# Patient Record
Sex: FEMALE | Race: WHITE | Employment: STUDENT | URBAN - METROPOLITAN AREA
[De-identification: names, ages, dates, MRNs, and addresses within clinical notes are randomized per-mention and may not be internally consistent; named-entity substitution may affect disease eponyms.]

---

## 2017-06-05 DIAGNOSIS — Z30.40 ENCOUNTER FOR SURVEILLANCE OF CONTRACEPTIVES: ICD-10-CM

## 2017-06-05 RX ORDER — NORETHINDRONE ACETATE AND ETHINYL ESTRADIOL AND FERROUS FUMARATE 1MG-20(21)
KIT ORAL
Qty: 28 TABLET | Refills: 0 | Status: SHIPPED | OUTPATIENT
Start: 2017-06-05 | End: 2017-07-05

## 2017-07-05 DIAGNOSIS — Z30.40 ENCOUNTER FOR SURVEILLANCE OF CONTRACEPTIVES: ICD-10-CM

## 2017-07-05 RX ORDER — NORETHINDRONE ACETATE AND ETHINYL ESTRADIOL AND FERROUS FUMARATE 1MG-20(21)
KIT ORAL
Qty: 28 TABLET | Refills: 0 | Status: SHIPPED | OUTPATIENT
Start: 2017-07-05 | End: 2017-07-10

## 2017-07-10 ENCOUNTER — OFFICE VISIT (OUTPATIENT)
Dept: FAMILY MEDICINE | Facility: CLINIC | Age: 27
End: 2017-07-10
Payer: COMMERCIAL

## 2017-07-10 VITALS
SYSTOLIC BLOOD PRESSURE: 115 MMHG | BODY MASS INDEX: 22.49 KG/M2 | DIASTOLIC BLOOD PRESSURE: 78 MMHG | HEIGHT: 65 IN | OXYGEN SATURATION: 100 % | WEIGHT: 135 LBS | TEMPERATURE: 97.5 F | HEART RATE: 63 BPM

## 2017-07-10 DIAGNOSIS — Z11.3 SCREENING EXAMINATION FOR VENEREAL DISEASE: ICD-10-CM

## 2017-07-10 DIAGNOSIS — R87.610 PAPANICOLAOU SMEAR OF CERVIX WITH ATYPICAL SQUAMOUS CELLS OF UNDETERMINED SIGNIFICANCE (ASC-US): ICD-10-CM

## 2017-07-10 DIAGNOSIS — Z00.00 ENCOUNTER FOR ROUTINE ADULT HEALTH EXAMINATION WITHOUT ABNORMAL FINDINGS: Primary | ICD-10-CM

## 2017-07-10 DIAGNOSIS — Z30.41 ENCOUNTER FOR SURVEILLANCE OF CONTRACEPTIVE PILLS: ICD-10-CM

## 2017-07-10 PROCEDURE — 99395 PREV VISIT EST AGE 18-39: CPT | Performed by: PHYSICIAN ASSISTANT

## 2017-07-10 PROCEDURE — 87491 CHLMYD TRACH DNA AMP PROBE: CPT | Performed by: PHYSICIAN ASSISTANT

## 2017-07-10 PROCEDURE — 88175 CYTOPATH C/V AUTO FLUID REDO: CPT | Performed by: PHYSICIAN ASSISTANT

## 2017-07-10 PROCEDURE — G0476 HPV COMBO ASSAY CA SCREEN: HCPCS | Performed by: PHYSICIAN ASSISTANT

## 2017-07-10 PROCEDURE — 88141 CYTOPATH C/V INTERPRET: CPT | Performed by: PHYSICIAN ASSISTANT

## 2017-07-10 PROCEDURE — 87591 N.GONORRHOEAE DNA AMP PROB: CPT | Performed by: PHYSICIAN ASSISTANT

## 2017-07-10 RX ORDER — NORETHINDRONE ACETATE AND ETHINYL ESTRADIOL 1MG-20(21)
1 KIT ORAL DAILY
Qty: 90 TABLET | Refills: 3 | Status: SHIPPED | OUTPATIENT
Start: 2017-07-10 | End: 2018-07-12

## 2017-07-10 NOTE — NURSING NOTE
"Chief Complaint   Patient presents with     Physical       Initial /78  Pulse 63  Temp 97.5  F (36.4  C) (Oral)  Ht 5' 5\" (1.651 m)  Wt 135 lb (61.2 kg)  SpO2 100%  BMI 22.47 kg/m2 Estimated body mass index is 22.47 kg/(m^2) as calculated from the following:    Height as of this encounter: 5' 5\" (1.651 m).    Weight as of this encounter: 135 lb (61.2 kg).  Medication Reconciliation: complete  Alysa Zazueta M.A.    "

## 2017-07-10 NOTE — PROGRESS NOTES
SUBJECTIVE:   CC: Saranya Zaman is an 27 year old woman who presents for preventive health visit.     Healthy Habits:    Do you get at least three servings of calcium containing foods daily (dairy, green leafy vegetables, etc.)? yes    Amount of exercise or daily activities, outside of work: 2-3 day(s) per week    Problems taking medications regularly No    Medication side effects: No    Have you had an eye exam in the past two years? no    Do you see a dentist twice per year? no    Do you have sleep apnea, excessive snoring or daytime drowsiness?no      Needs refill of birth control. Denies any problems with her birth control.      Today's PHQ-2 Score:   PHQ-2 ( 1999 Pf[employer]) 7/10/2017 6/1/2015   Q1: Little interest or pleasure in doing things 0 0   Q2: Feeling down, depressed or hopeless 0 0   PHQ-2 Score 0 0       Abuse: Current or Past(Physical, Sexual or Emotional)- No  Do you feel safe in your environment - Yes    Social History   Substance Use Topics     Smoking status: Never Smoker     Smokeless tobacco: Never Used     Alcohol use No     The patient does not drink >3 drinks per day nor >7 drinks per week.    Reviewed orders with patient.  Reviewed health maintenance and updated orders accordingly - Yes  BP Readings from Last 3 Encounters:   07/10/17 115/78   07/08/16 122/81   06/08/15 104/65    Wt Readings from Last 3 Encounters:   07/10/17 135 lb (61.2 kg)   07/08/16 142 lb (64.4 kg)   06/08/15 143 lb 3.2 oz (65 kg)                  Patient Active Problem List   Diagnosis     Chronic rhinitis     Fibroadenoma of breast     CARDIOVASCULAR SCREENING; LDL GOAL LESS THAN 160     Contraception     Papanicolaou smear of cervix with atypical squamous cells of undetermined significance (ASC-US)     Intermittent asthma, uncomplicated     Past Surgical History:   Procedure Laterality Date     BIOPSY OF BREAST, INCISIONAL  04/17/2008    left fibroadenoma     LUMPECTOMY BREAST  3/11    right      SURGICAL  HISTORY OF -   2010    wisdom teeth       Social History   Substance Use Topics     Smoking status: Never Smoker     Smokeless tobacco: Never Used     Alcohol use No     Family History   Problem Relation Age of Onset     Hypertension Mother      Arthritis Mother      Prostate Cancer Father      Hyperlipidemia Father      OSTEOPOROSIS Maternal Grandmother      CANCER Maternal Grandfather      CEREBROVASCULAR DISEASE Maternal Grandfather      70's, smoker     Cardiovascular Paternal Grandmother      DIABETES No family hx of      Breast Cancer No family hx of      Colon Cancer No family hx of      Thyroid Disease No family hx of      Genetic Disorder No family hx of          Current Outpatient Prescriptions   Medication Sig Dispense Refill     norethindrone-ethinyl estradiol (MICROGESTIN FE 1/20) 1-20 MG-MCG per tablet Take 1 tablet by mouth daily 90 tablet 3     valACYclovir (VALTREX) 500 MG tablet TAKE 1 TABLET BY MOUTH DAILY. TAKE 1 TABLET TWICE DAILY FOR 3 DAYS WITH AN OUTBREAK 30 tablet 5     albuterol (PROAIR HFA, PROVENTIL HFA, VENTOLIN HFA) 108 (90 BASE) MCG/ACT inhaler Inhale 2 puffs into the lungs every 6 hours 1 Inhaler 3     fexofenadine (ALLEGRA) 60 MG tablet Take 1 tablet by mouth 2 times daily. Will need office visit for further refills 60 tablet 0     [DISCONTINUED] MICROGESTIN FE 1/20 1-20 MG-MCG per tablet TAKE 1 TABLET BY MOUTH DAILY 28 tablet 0     beclomethasone (QVAR) 80 MCG/ACT Inhaler Inhale 1 puff into the lungs 2 times daily (Patient not taking: Reported on 7/10/2017) 1 Inhaler 1     Allergies   Allergen Reactions     Cats      Dogs      Dust Mites      Grass      Horse Allergy      Mold      Pollen [Pollen Extract]        Mammogram not appropriate for this patient based on age.    Pertinent mammograms are reviewed under the imaging tab.  History of abnormal Pap smear:   YES - updated in Problem List and Health Maintenance accordingly  Last 3 Pap Results:   PAP (no units)   Date Value  "  07/08/2016 NIL   06/01/2015 ASC-US (A)   09/21/2009 NIL       Reviewed and updated as needed this visit by clinical staff  Allergies  Meds         Reviewed and updated as needed this visit by Provider            ROS:  C: NEGATIVE for fever, chills, change in weight  I: NEGATIVE for worrisome rashes, moles or lesions  E: NEGATIVE for vision changes or irritation  ENT: NEGATIVE for ear, mouth and throat problems  R: NEGATIVE for significant cough or SOB  B: NEGATIVE for masses, tenderness or discharge  CV: NEGATIVE for chest pain, palpitations or peripheral edema  GI: NEGATIVE for nausea, abdominal pain, heartburn, or change in bowel habits  : NEGATIVE for unusual urinary or vaginal symptoms. Periods are regular.  M: NEGATIVE for significant arthralgias or myalgia  N: NEGATIVE for weakness, dizziness or paresthesias  P: NEGATIVE for changes in mood or affect    OBJECTIVE:   /78  Pulse 63  Temp 97.5  F (36.4  C) (Oral)  Ht 5' 5\" (1.651 m)  Wt 135 lb (61.2 kg)  SpO2 100%  BMI 22.47 kg/m2  EXAM:  GENERAL: healthy, alert and no distress  EYES: Eyes grossly normal to inspection, PERRL and conjunctivae and sclerae normal  HENT: ear canals and TM's normal, nose and mouth without ulcers or lesions  NECK: no adenopathy, no asymmetry, masses, or scars and thyroid normal to palpation  RESP: lungs clear to auscultation - no rales, rhonchi or wheezes  BREAST: normal without masses, tenderness or nipple discharge and no palpable axillary masses or adenopathy  CV: regular rate and rhythm, normal S1 S2, no S3 or S4, no murmur, click or rub, no peripheral edema and peripheral pulses strong  ABDOMEN: soft, nontender, no hepatosplenomegaly, no masses and bowel sounds normal   (female): normal female external genitalia, normal urethral meatus , vaginal mucosa pink, moist, well rugated and normal cervix, adnexae, and uterus without masses.  MS: no gross musculoskeletal defects noted, no edema  SKIN: no suspicious " "lesions or rashes  NEURO: Normal strength and tone, mentation intact and speech normal  PSYCH: mentation appears normal, affect normal/bright    ASSESSMENT/PLAN:       ICD-10-CM    1. Encounter for routine adult health examination without abnormal findings Z00.00    2. Encounter for surveillance of contraceptive pills Z30.41 norethindrone-ethinyl estradiol (MICROGESTIN FE 1/20) 1-20 MG-MCG per tablet   3. Papanicolaou smear of cervix with atypical squamous cells of undetermined significance (ASC-US) R87.610 Pap imaged thin layer diagnostic reflex to HPV if ASCUS     HPV High Risk Types DNA Cervical   4. Screening examination for venereal disease Z11.3 Chlamydia trachomatis PCR     Neisseria gonorrhoeae PCR       COUNSELING:   Reviewed preventive health counseling, as reflected in patient instructions       Regular exercise       Healthy diet/nutrition         reports that she has never smoked. She has never used smokeless tobacco.    Estimated body mass index is 22.47 kg/(m^2) as calculated from the following:    Height as of this encounter: 5' 5\" (1.651 m).    Weight as of this encounter: 135 lb (61.2 kg).       Counseling Resources:  ATP IV Guidelines  Pooled Cohorts Equation Calculator  Breast Cancer Risk Calculator  FRAX Risk Assessment  ICSI Preventive Guidelines  Dietary Guidelines for Americans, 2010  USDA's MyPlate  ASA Prophylaxis  Lung CA Screening    Korin Salas PA-C  [facility]  "

## 2017-07-10 NOTE — MR AVS SNAPSHOT
After Visit Summary   7/10/2017    Saranya Zaman    MRN: [id number]           Patient Information     Date Of Birth          1990        Visit Information        Provider Department      7/10/2017 8:00 AM Korin Salas PA-C [facility]        Today's Diagnoses     Encounter for routine adult health examination without abnormal findings    -  1    Encounter for surveillance of contraceptives        Papanicolaou smear of cervix with atypical squamous cells of undetermined significance (ASC-US)        Screening examination for venereal disease          Care Instructions      Preventive Health Recommendations  Female Ages 26 - 39  Yearly exam:   See your health care provider every year in order to    Review health changes.     Discuss preventive care.      Review your medicines if you your doctor has prescribed any.    Until age 30: Get a Pap test every three years (more often if you have had an abnormal result).    After age 30: Talk to your doctor about whether you should have a Pap test every 3 years or have a Pap test with HPV screening every 5 years.   You do not need a Pap test if your uterus was removed (hysterectomy) and you have not had cancer.  You should be tested each year for STDs (sexually transmitted diseases), if you're at risk.   Talk to your provider about how often to have your cholesterol checked.  If you are at risk for diabetes, you should have a diabetes test (fasting glucose).  Shots: Get a flu shot each year. Get a tetanus shot every 10 years.   Nutrition:     Eat at least 5 servings of fruits and vegetables each day.    Eat whole-grain bread, whole-wheat pasta and brown rice instead of white grains and rice.    Talk to your provider about Calcium and Vitamin D.     Lifestyle    Exercise at least 150 minutes a week (30 minutes a day, 5 days of the week). This will help you control your weight and prevent disease.    Limit alcohol to one drink per  "day.    No smoking.     Wear sunscreen to prevent skin cancer.    See your dentist every six months for an exam and cleaning.            Follow-ups after your visit        Who to contact     If you have questions or need follow up information about today's clinic visit or your schedule please contact [facility] directly at [phone].  Normal or non-critical lab and imaging results will be communicated to you by MyChart, letter or phone within 4 business days after the clinic has received the results. If you do not hear from us within 7 days, please contact the clinic through [employer] or phone. If you have a critical or abnormal lab result, we will notify you by phone as soon as possible.  Submit refill requests through [employer] or call your pharmacy and they will forward the refill request to us. Please allow 3 business days for your refill to be completed.          Additional Information About Your Visit        MyChart Information     [employer] lets you send messages to your doctor, view your test results, renew your prescriptions, schedule appointments and more. To sign up, go to [email or web address] . Click on \"Log in\" on the left side of the screen, which will take you to the Welcome page. Then click on \"Sign up Now\" on the right side of the page.     You will be asked to enter the access code listed below, as well as some personal information. Please follow the directions to create your username and password.     Your access code is: [email or web address]  Expires: 10/8/2017  8:31 AM     Your access code will  in 90 days. If you need help or a new code, please call your [place] clinic or [phone].        Care EveryWhere ID     This is your Care EveryWhere ID. This could be used by other organizations to access your [place] medical records  [id number]        Your Vitals Were     Pulse Temperature Height Pulse Oximetry BMI (Body Mass Index)       63 97.5  F (36.4  C) (Oral) 5' 5\" (1.651 m) " 100% 22.47 kg/m2        Blood Pressure from Last 3 Encounters:   07/10/17 115/78   07/08/16 122/81   06/08/15 104/65    Weight from Last 3 Encounters:   07/10/17 135 lb (61.2 kg)   07/08/16 142 lb (64.4 kg)   06/08/15 143 lb 3.2 oz (65 kg)              We Performed the Following     Chlamydia trachomatis PCR     HPV High Risk Types DNA Cervical     Neisseria gonorrhoeae PCR     Pap imaged thin layer diagnostic reflex to HPV if ASCUS        Primary Care Provider Office Phone # Fax #    Keke Rivera PA-C [phone]       XXX RESIGNED XXX [address]  [facility] [address]        Equal Access to Services     ANAND HUMPHREY : Hadii jeremías ku hadasho Soomaali, waaxda luqadaha, qaybta kaalmada adeegyada, waxay cordeliain kevin nunez . So [facility] [phone].    ATENCIÓN: Si habla español, tiene a hess disposición servicios gratuitos de asistencia lingüística. Ll[facility] [phone].    We comply with applicable federal civil rights laws and [place] laws. We do not discriminate on the basis of race, color, national origin, age, disability sex, sexual orientation or gender identity.            Thank you!     Thank you for choosing [facility]  for your care. Our goal is always to provide you with excellent care. Hearing back from our patients is one way we can continue to improve our services. Please take a few minutes to complete the written survey that you may receive in the mail after your visit with us. Thank you!             Your Updated Medication List - Protect others around you: Learn how to safely use, store and throw away your medicines at www.disposemymeds.org.          This list is accurate as of: 7/10/17  8:31 AM.  Always use your most recent med list.                   Brand Name Dispense Instructions for use Diagnosis    albuterol 108 (90 BASE) MCG/ACT Inhaler    PROAIR HFA/PROVENTIL HFA/VENTOLIN HFA    1 Inhaler    Inhale 2 puffs into the lungs every 6 hours     Intermittent asthma, uncomplicated       beclomethasone 80 MCG/ACT Inhaler    QVAR    1 Inhaler    Inhale 1 puff into the lungs 2 times daily    Intermittent asthma, uncomplicated       fexofenadine 60 MG tablet    ALLEGRA    60 tablet    Take 1 tablet by mouth 2 times daily. Will need office visit for further refills    Chronic rhinitis       MICROGESTIN FE 1/20 1-20 MG-MCG per tablet   Generic drug:  norethindrone-ethinyl estradiol     28 tablet    TAKE 1 TABLET BY MOUTH DAILY    Encounter for surveillance of contraceptives       valACYclovir 500 MG tablet    VALTREX    30 tablet    TAKE 1 TABLET BY MOUTH DAILY. TAKE 1 TABLET TWICE DAILY FOR 3 DAYS WITH AN OUTBREAK    Cold sore

## 2017-07-10 NOTE — LETTER
[facility]  [address] [place] [facility] [place] [address] [phone]        July 11, 2017    Saranya Zaman  [address]  [place] [address]            Dear Saranya,    The results of your recent tests were normal.  Below is a copy of the results.  It was a pleasure to see you at your last appointment.    If you have any questions or concerns, please call myself or my nurse at [phone].    Sincerely,    Korin Salas PA-C/ks    Results for orders placed or performed in visit on 07/10/17   Chlamydia trachomatis PCR   Result Value Ref Range    Specimen Description Cervical     Chlamydia Trachomatis PCR  NEG     Negative   Negative for C. trachomatis rRNA by transcription mediated amplification.   A negative result by transcription mediated amplification does not preclude the   presence of C. trachomatis infection because results are dependent on proper   and adequate collection, absence of inhibitors, and sufficient rRNA to be   detected.     Neisseria gonorrhoeae PCR   Result Value Ref Range    Specimen Descrip Cervical     N Gonorrhea PCR  NEG     Negative   Negative for N. gonorrhoeae rRNA by transcription mediated amplification.   A negative result by transcription mediated amplification does not preclude the   presence of N. gonorrhoeae infection because results are dependent on proper   and adequate collection, absence of inhibitors, and sufficient rRNA to be   detected.

## 2017-07-11 LAB
C TRACH DNA SPEC QL NAA+PROBE: NORMAL
N GONORRHOEA DNA SPEC QL NAA+PROBE: NORMAL
SPECIMEN SOURCE: NORMAL
SPECIMEN SOURCE: NORMAL

## 2017-07-11 ASSESSMENT — ASTHMA QUESTIONNAIRES: ACT_TOTALSCORE: 24

## 2017-07-14 LAB
COPATH REPORT: ABNORMAL
PAP: ABNORMAL

## 2017-07-17 LAB
FINAL DIAGNOSIS: ABNORMAL
HPV HR 12 DNA CVX QL NAA+PROBE: POSITIVE
HPV16 DNA SPEC QL NAA+PROBE: NEGATIVE
HPV18 DNA SPEC QL NAA+PROBE: NEGATIVE
SPECIMEN DESCRIPTION: ABNORMAL

## 2017-08-14 ENCOUNTER — OFFICE VISIT (OUTPATIENT)
Dept: OBGYN | Facility: CLINIC | Age: 27
End: 2017-08-14
Payer: COMMERCIAL

## 2017-08-14 VITALS
OXYGEN SATURATION: 97 % | HEART RATE: 99 BPM | WEIGHT: 136 LBS | BODY MASS INDEX: 22.63 KG/M2 | DIASTOLIC BLOOD PRESSURE: 80 MMHG | SYSTOLIC BLOOD PRESSURE: 124 MMHG

## 2017-08-14 DIAGNOSIS — R87.610 PAPANICOLAOU SMEAR OF CERVIX WITH ATYPICAL SQUAMOUS CELLS OF UNDETERMINED SIGNIFICANCE (ASC-US): Primary | ICD-10-CM

## 2017-08-14 PROCEDURE — 99212 OFFICE O/P EST SF 10 MIN: CPT | Mod: 25 | Performed by: OBSTETRICS & GYNECOLOGY

## 2017-08-14 PROCEDURE — 57452 EXAM OF CERVIX W/SCOPE: CPT | Performed by: OBSTETRICS & GYNECOLOGY

## 2017-08-14 NOTE — PROGRESS NOTES
Saranya presents for colposcopy. Pap showed: ASCUS HR HPV+.     PMH/PSH/Meds/Allergies reviewed & documented in [facility].    I discussed with the patient the results of her pap smear and/or HPV studies.    I discussed our current understanding of abnormal cytology and the role hpv can play in pre-cancerous cervical change.  I explained the current cytological/histologic terminology.      We discussed the screening nature of pap smears and the need for a more definitive examination.    She is given the opportunity to ask questions and have them answered.  She does agree to proceed with colposcopy.    Procedure for colposcopy and biopsy has been explained to the   patient and consent obtained.    PROCEDURE:  Speculum placed in vagina and excellent visualization of cervix achieved, cervix swabbed  with acetic acid solution.    FINDINGS:  Cervix: no visible lesions, no mosaicism, no punctation and no abnormal vasculature; SCJ visualized 360 degrees without lesions and no biopsies taken.    Vaginal inspection: vaginal colposcopy not performed.    Vulvar colposcopy: vulvar colposcopy not performed.    Procedure Summary: Patient tolerated procedure well and colposcopy adequate.    Colposcopic Impression: HPV effect    5-10 minutes were spent in face to face discussion regarding her pap and HPV status.  This was in addition to the time required for the procedure.     Plan:  Co-testing 1 year.    David Weber MD FACOG

## 2017-08-14 NOTE — MR AVS SNAPSHOT
"              After Visit Summary   2017    Saranya Zaman    MRN: [id number]           Patient Information     Date Of Birth          1990        Visit Information        Provider Department      2017 11:50 AM David Weber MD; [place] PROCEDURE ROOM 2 [facility]        Today's Diagnoses     Papanicolaou smear of cervix with atypical squamous cells of undetermined significance (ASC-US)    -  1       Follow-ups after your visit        Who to contact     If you have questions or need follow up information about today's clinic visit or your schedule please contact [facility] directly at [phone].  Normal or non-critical lab and imaging results will be communicated to you by [employer], letter or phone within 4 business days after the clinic has received the results. If you do not hear from us within 7 days, please contact the clinic through [employer] or phone. If you have a critical or abnormal lab result, we will notify you by phone as soon as possible.  Submit refill requests through [employer] or call your pharmacy and they will forward the refill request to us. Please allow 3 business days for your refill to be completed.          Additional Information About Your Visit        MyChart Information     [employer] lets you send messages to your doctor, view your test results, renew your prescriptions, schedule appointments and more. To sign up, go to [email or web address] . Click on \"Log in\" on the left side of the screen, which will take you to the Welcome page. Then click on \"Sign up Now\" on the right side of the page.     You will be asked to enter the access code listed below, as well as some personal information. Please follow the directions to create your username and password.     Your access code is: [email or web address]  Expires: 10/8/2017  8:31 AM     Your access code will  in 90 days. If you need help or a new code, please call your [facility] or " [phone].        Care EveryWhere ID     This is your Care EveryWhere ID. This could be used by other organizations to access your [place] medical records  [id number]        Your Vitals Were     Pulse Pulse Oximetry BMI (Body Mass Index)             99 97% 22.63 kg/m2          Blood Pressure from Last 3 Encounters:   08/14/17 124/80   07/10/17 115/78   07/08/16 122/81    Weight from Last 3 Encounters:   08/14/17 136 lb (61.7 kg)   07/10/17 135 lb (61.2 kg)   07/08/16 142 lb (64.4 kg)              We Performed the Following     COLP CERVIX/UPPER VAGINA W BX CERVIX/ENDOCERV CURETT        Primary Care Provider Office Phone # Fax #    Keke Rivera PA-C [phone]       XXX RESIGNED XXX [address] [facility] [address]        Equal Access to Services     ANAND HUMPHREY : Hadii aad ku hadasho Soomaali, waaxda luqadaha, qaybta kaalmada adeegyada, waxay cordeliain haychapisn mary nunez . So [facility] [phone].    ATENCIÓN: Si habla español, tiene a hess disposición servicios gratuitos de asistencia lingüística. Llame al [phone].    We comply with applicable federal civil rights laws and [place] laws. We do not discriminate on the basis of race, color, national origin, age, disability sex, sexual orientation or gender identity.            Thank you!     Thank you for choosing [facility]  for your care. Our goal is always to provide you with excellent care. Hearing back from our patients is one way we can continue to improve our services. Please take a few minutes to complete the written survey that you may receive in the mail after your visit with us. Thank you!             Your Updated Medication List - Protect others around you: Learn how to safely use, store and throw away your medicines at www.disposemymeds.org.          This list is accurate as of: 8/14/17 12:14 PM.  Always use your most recent med list.                   Brand Name Dispense Instructions for use Diagnosis     albuterol 108 (90 BASE) MCG/ACT Inhaler    PROAIR HFA/PROVENTIL HFA/VENTOLIN HFA    1 Inhaler    Inhale 2 puffs into the lungs every 6 hours    Intermittent asthma, uncomplicated       beclomethasone 80 MCG/ACT Inhaler    QVAR    1 Inhaler    Inhale 1 puff into the lungs 2 times daily    Intermittent asthma, uncomplicated       fexofenadine 60 MG tablet    ALLEGRA    60 tablet    Take 1 tablet by mouth 2 times daily. Will need office visit for further refills    Chronic rhinitis       norethindrone-ethinyl estradiol 1-20 MG-MCG per tablet    MICROGESTIN FE 1/20    90 tablet    Take 1 tablet by mouth daily    Encounter for surveillance of contraceptive pills       valACYclovir 500 MG tablet    VALTREX    30 tablet    TAKE 1 TABLET BY MOUTH DAILY. TAKE 1 TABLET TWICE DAILY FOR 3 DAYS WITH AN OUTBREAK    Cold sore

## 2017-08-14 NOTE — NURSING NOTE
"Chief Complaint   Patient presents with     Colposcopy       Initial /80  Pulse 99  Wt 136 lb (61.7 kg)  SpO2 97%  BMI 22.63 kg/m2 Estimated body mass index is 22.63 kg/(m^2) as calculated from the following:    Height as of 7/10/17: 5' 5\" (1.651 m).    Weight as of this encounter: 136 lb (61.7 kg).  Medication Reconciliation: susannah Zazueta M.A.    "

## 2017-09-06 DIAGNOSIS — B00.1 COLD SORE: ICD-10-CM

## 2017-09-08 RX ORDER — VALACYCLOVIR HYDROCHLORIDE 500 MG/1
TABLET, FILM COATED ORAL
Qty: 30 TABLET | Refills: 0 | Status: SHIPPED | OUTPATIENT
Start: 2017-09-08 | End: 2018-04-08

## 2017-09-08 NOTE — TELEPHONE ENCOUNTER
Routing refill request to provider for review/approval because:  Labs not current:  Creatinine  Not addressed at last OV.     Medication could not be refilled per [facility] RN protocol.   Alyx Curran RN   [facility]

## 2018-04-08 DIAGNOSIS — B00.1 COLD SORE: ICD-10-CM

## 2018-04-09 RX ORDER — VALACYCLOVIR HYDROCHLORIDE 500 MG/1
1000 TABLET, FILM COATED ORAL 2 TIMES DAILY
Qty: 24 TABLET | Refills: 0 | Status: SHIPPED | OUTPATIENT
Start: 2018-04-09 | End: 2018-06-19

## 2018-04-09 NOTE — TELEPHONE ENCOUNTER
Routing refill request to provider for review/approval because:  Labs not current:  Creatinine    Catalina Song RN

## 2018-06-19 DIAGNOSIS — B00.1 COLD SORE: ICD-10-CM

## 2018-06-20 RX ORDER — VALACYCLOVIR HYDROCHLORIDE 500 MG/1
TABLET, FILM COATED ORAL
Qty: 24 TABLET | Refills: 0 | Status: SHIPPED | OUTPATIENT
Start: 2018-06-20 | End: 2018-07-12

## 2018-07-10 DIAGNOSIS — Z30.41 ENCOUNTER FOR SURVEILLANCE OF CONTRACEPTIVE PILLS: ICD-10-CM

## 2018-07-10 RX ORDER — NORETHINDRONE ACETATE AND ETHINYL ESTRADIOL AND FERROUS FUMARATE 1MG-20(21)
KIT ORAL
Qty: 84 TABLET | Refills: 0 | Status: CANCELLED | OUTPATIENT
Start: 2018-07-10

## 2018-07-12 ENCOUNTER — OFFICE VISIT (OUTPATIENT)
Dept: FAMILY MEDICINE | Facility: CLINIC | Age: 28
End: 2018-07-12
Payer: COMMERCIAL

## 2018-07-12 ENCOUNTER — TELEPHONE (OUTPATIENT)
Dept: FAMILY MEDICINE | Facility: CLINIC | Age: 28
End: 2018-07-12

## 2018-07-12 VITALS
TEMPERATURE: 98.1 F | HEIGHT: 66 IN | BODY MASS INDEX: 23.46 KG/M2 | OXYGEN SATURATION: 100 % | HEART RATE: 74 BPM | SYSTOLIC BLOOD PRESSURE: 127 MMHG | RESPIRATION RATE: 14 BRPM | WEIGHT: 146 LBS | DIASTOLIC BLOOD PRESSURE: 82 MMHG

## 2018-07-12 DIAGNOSIS — Z00.00 ROUTINE GENERAL MEDICAL EXAMINATION AT A HEALTH CARE FACILITY: Primary | ICD-10-CM

## 2018-07-12 DIAGNOSIS — R87.610 PAPANICOLAOU SMEAR OF CERVIX WITH ATYPICAL SQUAMOUS CELLS OF UNDETERMINED SIGNIFICANCE (ASC-US): ICD-10-CM

## 2018-07-12 DIAGNOSIS — Z30.41 ENCOUNTER FOR SURVEILLANCE OF CONTRACEPTIVE PILLS: ICD-10-CM

## 2018-07-12 DIAGNOSIS — Z23 NEED FOR TDAP VACCINATION: ICD-10-CM

## 2018-07-12 DIAGNOSIS — J45.20 INTERMITTENT ASTHMA, UNCOMPLICATED: ICD-10-CM

## 2018-07-12 DIAGNOSIS — B00.1 COLD SORE: ICD-10-CM

## 2018-07-12 PROCEDURE — G0476 HPV COMBO ASSAY CA SCREEN: HCPCS | Performed by: PHYSICIAN ASSISTANT

## 2018-07-12 PROCEDURE — 99395 PREV VISIT EST AGE 18-39: CPT | Mod: 25 | Performed by: PHYSICIAN ASSISTANT

## 2018-07-12 PROCEDURE — 88141 CYTOPATH C/V INTERPRET: CPT | Performed by: PHYSICIAN ASSISTANT

## 2018-07-12 PROCEDURE — 88175 CYTOPATH C/V AUTO FLUID REDO: CPT | Performed by: PHYSICIAN ASSISTANT

## 2018-07-12 PROCEDURE — 90471 IMMUNIZATION ADMIN: CPT | Performed by: PHYSICIAN ASSISTANT

## 2018-07-12 PROCEDURE — 90715 TDAP VACCINE 7 YRS/> IM: CPT | Performed by: PHYSICIAN ASSISTANT

## 2018-07-12 RX ORDER — ALBUTEROL SULFATE 90 UG/1
2 AEROSOL, METERED RESPIRATORY (INHALATION) EVERY 6 HOURS
Qty: 1 INHALER | Refills: 3 | Status: SHIPPED | OUTPATIENT
Start: 2018-07-12 | End: 2018-07-12

## 2018-07-12 RX ORDER — ALBUTEROL SULFATE 90 UG/1
2 AEROSOL, METERED RESPIRATORY (INHALATION) EVERY 6 HOURS
Qty: 1 INHALER | Refills: 3 | Status: SHIPPED | OUTPATIENT
Start: 2018-07-12 | End: 2019-07-15

## 2018-07-12 RX ORDER — NORETHINDRONE ACETATE AND ETHINYL ESTRADIOL 1MG-20(21)
1 KIT ORAL DAILY
Qty: 90 TABLET | Refills: 3 | Status: SHIPPED | OUTPATIENT
Start: 2018-07-12 | End: 2019-06-10

## 2018-07-12 RX ORDER — VALACYCLOVIR HYDROCHLORIDE 500 MG/1
TABLET, FILM COATED ORAL
Qty: 45 TABLET | Refills: 1 | Status: SHIPPED | OUTPATIENT
Start: 2018-07-12 | End: 2019-06-10

## 2018-07-12 ASSESSMENT — PAIN SCALES - GENERAL: PAINLEVEL: NO PAIN (0)

## 2018-07-12 NOTE — LETTER
My Asthma Action Plan  Name: Saranya Zaman   YOB: 1990  Date: 7/11/2018   My doctor: Kristen M. Kehr, PA-C   My clinic: [facility]        My Control Medicine: None  My Rescue Medicine: Albuterol (Proair/Ventolin/Proventil) inhaler as needed   My Asthma Severity: intermittent  Avoid your asthma triggers: upper respiratory infections and pollens               GREEN ZONE   Good Control    I feel good    No cough or wheeze    Can work, sleep and play without asthma symptoms       Take your asthma control medicine every day.     1. If exercise triggers your asthma, take your rescue medication    15 minutes before exercise or sports, and    During exercise if you have asthma symptoms  2. Spacer to use with inhaler: If you have a spacer, make sure to use it with your inhaler             YELLOW ZONE Getting Worse  I have ANY of these:    I do not feel good    Cough or wheeze    Chest feels tight    Wake up at night   1. Keep taking your Green Zone medications  2. Start taking your rescue medicine:    every 20 minutes for up to 1 hour. Then every 4 hours for 24-48 hours.  3. If you stay in the Yellow Zone for more than 12-24 hours, contact your doctor.  4. If you do not return to the Green Zone in 12-24 hours or you get worse, start taking your oral steroid medicine if prescribed by your provider.           RED ZONE Medical Alert - Get Help  I have ANY of these:    I feel awful    Medicine is not helping    Breathing getting harder    Trouble walking or talking    Nose opens wide to breathe       1. Take your rescue medicine NOW  2. If your provider has prescribed an oral steroid medicine, start taking it NOW  3. Call your doctor NOW  4. If you are still in the Red Zone after 20 minutes and you have not reached your doctor:    Take your rescue medicine again and    Call 911 or go to the emergency room right away    See your regular doctor within 2 weeks of an Emergency Room or Urgent  Care visit for follow-up treatment.          Annual Reminders:  Meet with Asthma Educator,  Flu Shot in the Fall, consider Pneumonia Vaccination for patients with asthma (aged 19 and older).    Pharmacy:    [id number] - [place], [place] - [address] AT [address]  [facility] - [place], [place] - [address]  [facility] [place] - [address]  [facility] - [place], [place] - [address] AT [address]  [facility] - [place], [place] - [address]                      Asthma Triggers  How To Control Things That Make Your Asthma Worse    Triggers are things that make your asthma worse.  Look at the list below to help you find your triggers and what you can do about them.  You can help prevent asthma flare-ups by staying away from your triggers.      Trigger                                                          What you can do   Cigarette Smoke  Tobacco smoke can make asthma worse. Do not allow smoking in your home, car or around you.  Be sure no one smokes at a child s day care or school.  If you smoke, ask your health care provider for ways to help you quit.  Ask family members to quit too.  Ask your health care provider for a referral to Quit Plan to help you quit smoking, or call 2-659-226-PLAN.     Colds, Flu, Bronchitis  These are common triggers of asthma. Wash your hands often.  Don t touch your eyes, nose or mouth.  Get a flu shot every year.     Dust Mites  These are tiny bugs that live in cloth or carpet. They are too small to see. Wash sheets and blankets in hot water every week.   Encase pillows and mattress in dust mite proof covers.  Avoid having carpet if you can. If you have carpet, vacuum weekly.   Use a dust mask and HEPA vacuum.   Pollen and Outdoor Mold  Some people are allergic  to trees, grass, or weed pollen, or molds. Try to keep your windows closed.  Limit time out doors when pollen count is high.   Ask you health care provider about taking medicine during allergy season.     Animal Dander  Some people are allergic to skin flakes, urine or saliva from pets with fur or feathers. Keep pets with fur or feathers out of your home.    If you can t keep the pet outdoors, then keep the pet out of your bedroom.  Keep the bedroom door closed.  Keep pets off cloth furniture and away from stuffed toys.     Mice, Rats, and Cockroaches  Some people are allergic to the waste from these pests.   Cover food and garbage.  Clean up spills and food crumbs.  Store grease in the refrigerator.   Keep food out of the bedroom.   Indoor Mold  This can be a trigger if your home has high moisture. Fix leaking faucets, pipes, or other sources of water.   Clean moldy surfaces.  Dehumidify basement if it is damp and smelly.   Smoke, Strong Odors, and Sprays  These can reduce air quality. Stay away from strong odors and sprays, such as perfume, powder, hair spray, paints, smoke incense, paint, cleaning products, candles and new carpet.   Exercise or Sports  Some people with asthma have this trigger. Be active!  Ask your doctor about taking medicine before sports or exercise to prevent symptoms.    Warm up for 5-10 minutes before and after sports or exercise.     Other Triggers of Asthma  Cold air:  Cover your nose and mouth with a scarf.  Sometimes laughing or crying can be a trigger.  Some medicines and food can trigger asthma.

## 2018-07-12 NOTE — MR AVS SNAPSHOT
After Visit Summary   7/12/2018    Saranya Zaman    MRN: [id number]           Patient Information     Date Of Birth          1990        Visit Information        Provider Department      7/12/2018 8:40 AM Kehr, Kristen M, PA-C [facility]        Today's Diagnoses     Routine general medical examination at a health care facility    -  1    Encounter for surveillance of contraceptive pills        Cold sore        Intermittent asthma, uncomplicated        Need for Tdap vaccination        Papanicolaou smear of cervix with atypical squamous cells of undetermined significance (ASC-US)          Care Instructions      Preventive Health Recommendations  Female Ages 26 - 39  Yearly exam:   See your health care provider every year in order to    Review health changes.     Discuss preventive care.      Review your medicines if you your doctor has prescribed any.    Until age 30: Get a Pap test every three years (more often if you have had an abnormal result).    After age 30: Talk to your doctor about whether you should have a Pap test every 3 years or have a Pap test with HPV screening every 5 years.   You do not need a Pap test if your uterus was removed (hysterectomy) and you have not had cancer.  You should be tested each year for STDs (sexually transmitted diseases), if you're at risk.   Talk to your provider about how often to have your cholesterol checked.  If you are at risk for diabetes, you should have a diabetes test (fasting glucose).  Shots: Get a flu shot each year. Get a tetanus shot every 10 years.   Nutrition:     Eat at least 5 servings of fruits and vegetables each day.    Eat whole-grain bread, whole-wheat pasta and brown rice instead of white grains and rice.    Get adequate Calcium and Vitamin D.     Lifestyle    Exercise at least 150 minutes a week (30 minutes a day, 5 days of the week). This will help you control your weight and prevent disease.    Limit alcohol to  "one drink per day.    No smoking.     Wear sunscreen to prevent skin cancer.    See your dentist every six months for an exam and cleaning.            Follow-ups after your visit        Who to contact     If you have questions or need follow up information about today's clinic visit or your schedule please contact [facility] directly at [phone].  Normal or non-critical lab and imaging results will be communicated to you by MyChart, letter or phone within 4 business days after the clinic has received the results. If you do not hear from us within 7 days, please contact the clinic through MyChart or phone. If you have a critical or abnormal lab result, we will notify you by phone as soon as possible.  Submit refill requests through [employer] or call your pharmacy and they will forward the refill request to us. Please allow 3 business days for your refill to be completed.          Additional Information About Your Visit        Care EveryWhere ID     This is your Care EveryWhere ID. This could be used by other organizations to access your [facility] medical records  [id number]        Your Vitals Were     Pulse Temperature Respirations Height Pulse Oximetry BMI (Body Mass Index)    74 98.1  F (36.7  C) (Oral) 14 5' 6\" (1.676 m) 100% 23.57 kg/m2       Blood Pressure from Last 3 Encounters:   07/12/18 127/82   08/14/17 124/80   07/10/17 115/78    Weight from Last 3 Encounters:   07/12/18 146 lb (66.2 kg)   08/14/17 136 lb (61.7 kg)   07/10/17 135 lb (61.2 kg)              We Performed the Following     HPV High Risk Types DNA Cervical     Pap imaged thin layer diagnostic with HPV (select HPV order below)     TDAP, IM (10 - 64 YRS) - Adacel          Where to get your medicines      These medications were sent to [facility] [address] [facility] [address]  [facility]  [address] [address]     Phone:  [phone]     albuterol 108 (90 " Base) MCG/ACT Inhaler    norethindrone-ethinyl estradiol 1-20 MG-MCG per tablet    valACYclovir 500 MG tablet          Primary Care Provider Office Phone # Fax #    Kristen M Kehr, PA-C [phone] [address] [facility] [address]        Equal Access to Services     [facility] : Hadii aad ku hadasho Soomaali, waaxda luqadaha, qaybta kaalmada adeegyada, waxay idiin hayaan adeeg kharash lajennifer . So [facility] [phone].    ATENCIÓN: Si habla español, tiene a hess disposición servicios gratuitos de asistencia lingüística. [facility] [phone].    We comply with applicable federal civil rights laws and [place] laws. We do not discriminate on the basis of race, color, national origin, age, disability, sex, sexual orientation, or gender identity.            Thank you!     Thank you for choosing [facility]  for your care. Our goal is always to provide you with excellent care. Hearing back from our patients is one way we can continue to improve our services. Please take a few minutes to complete the written survey that you may receive in the mail after your visit with us. Thank you!             Your Updated Medication List - Protect others around you: Learn how to safely use, store and throw away your medicines at www.disposemymeds.org.          This list is accurate as of 7/12/18  9:14 AM.  Always use your most recent med list.                   Brand Name Dispense Instructions for use Diagnosis    albuterol 108 (90 Base) MCG/ACT Inhaler    PROAIR HFA/PROVENTIL HFA/VENTOLIN HFA    1 Inhaler    Inhale 2 puffs into the lungs every 6 hours    Intermittent asthma, uncomplicated       fexofenadine 60 MG tablet    ALLEGRA    60 tablet    Take 1 tablet by mouth 2 times daily. Will need office visit for further refills    Chronic rhinitis       norethindrone-ethinyl estradiol 1-20 MG-MCG per tablet    MICROGESTIN FE 1/20    90 tablet    Take 1 tablet by mouth daily    Encounter for  surveillance of contraceptive pills       valACYclovir 500 MG tablet    VALTREX    45 tablet    TAKE 2 TABLETS BY MOUTH TWICE DAILY FOR 3 DAYS AT ONSET OF SYMPTOMS OF OUTBREAK.    Cold sore

## 2018-07-12 NOTE — NURSING NOTE
"Chief Complaint   Patient presents with     Physical     Health Maintenance     TDAP       Initial /82  Pulse 74  Temp 98.1  F (36.7  C) (Oral)  Resp 14  Ht 5' 6\" (1.676 m)  Wt 146 lb (66.2 kg)  SpO2 100%  BMI 23.57 kg/m2 Estimated body mass index is 23.57 kg/(m^2) as calculated from the following:    Height as of this encounter: 5' 6\" (1.676 m).    Weight as of this encounter: 146 lb (66.2 kg).  Medication Reconciliation: complete    CLAIRE Vargas MA    "

## 2018-07-12 NOTE — LETTER
My Depression Action Plan  Name: Saranya Zaman   Date of Birth 1990  Date: 7/12/2018    My doctor: Kehr, Kristen M   My clinic: [address] [address] [phone]          GREEN    ZONE   Good Control    What it looks like:     Things are going generally well. You have normal up s and down s. You may even feel depressed from time to time, but bad moods usually last less than a day.   What you need to do:  1. Continue to care for yourself (see self care plan)  2. Check your depression survival kit and update it as needed  3. Follow your physician s recommendations including any medication.  4. Do not stop taking medication unless you consult with your physician first.           YELLOW         ZONE Getting Worse    What it looks like:     Depression is starting to interfere with your life.     It may be hard to get out of bed; you may be starting to isolate yourself from others.    Symptoms of depression are starting to last most all day and this has happened for several days.     You may have suicidal thoughts but they are not constant.   What you need to do:     1. Call your care team, your response to treatment will improve if you keep your care team informed of your progress. Yellow periods are signs an adjustment may need to be made.     2. Continue your self-care, even if you have to fake it!    3. Talk to someone in your support network    4. Open up your depression survival kit           RED    ZONE Medical Alert - Get Help    What it looks like:     Depression is seriously interfering with your life.     You may experience these or other symptoms: You can t get out of bed most days, can t work or engage in other necessary activities, you have trouble taking care of basic hygiene, or basic responsibilities, thoughts of suicide or death that will not go away, self-injurious behavior.     What you need to do:  1. Call your care team and  request a same-day appointment. If they are not available (weekends or after hours) call your local crisis line, emergency room or 911.            Depression Self Care Plan / Survival Kit    Self-Care for Depression  Here s the deal. Your body and mind are really not as separate as most people think.  What you do and think affects how you feel and how you feel influences what you do and think. This means if you do things that people who feel good do, it will help you feel better.  Sometimes this is all it takes.  There is also a place for medication and therapy depending on how severe your depression is, so be sure to consult with your medical provider and/ or Behavioral Health Consultant if your symptoms are worsening or not improving.     In order to better manage my stress, I will:    Exercise  Get some form of exercise, every day. This will help reduce pain and release endorphins, the  feel good  chemicals in your brain. This is almost as good as taking antidepressants!  This is not the same as joining a gym and then never going! (they count on that by the way ) It can be as simple as just going for a walk or doing some gardening, anything that will get you moving.      Hygiene   Maintain good hygiene (Get out of bed in the morning, Make your bed, Brush your teeth, Take a shower, and Get dressed like you were going to work, even if you are unemployed).  If your clothes don't fit try to get ones that do.    Diet  I will strive to eat foods that are good for me, drink plenty of water, and avoid excessive sugar, caffeine, alcohol, and other mood-altering substances.  Some foods that are helpful in depression are: complex carbohydrates, B vitamins, flaxseed, fish or fish oil, fresh fruits and vegetables.    Psychotherapy  I agree to participate in Individual Therapy (if recommended).    Medication  If prescribed medications, I agree to take them.  Missing doses can result in serious side effects.  I understand that  drinking alcohol, or other illicit drug use, may cause potential side effects.  I will not stop my medication abruptly without first discussing it with my provider.    Staying Connected With Others  I will stay in touch with my friends, family members, and my primary care provider/team.    Use your imagination  Be creative.  We all have a creative side; it doesn t matter if it s oil painting, sand castles, or mud pies! This will also kick up the endorphins.    Witness Beauty  (AKA stop and smell the roses) Take a look outside, even in mid-winter. Notice colors, textures. Watch the squirrels and birds.     Service to others  Be of service to others.  There is always someone else in need.  By helping others we can  get out of ourselves  and remember the really important things.  This also provides opportunities for practicing all the other parts of the program.    Humor  Laugh and be silly!  Adjust your TV habits for less news and crime-drama and more comedy.    Control your stress  Try breathing deep, massage therapy, biofeedback, and meditation. Find time to relax each day.     My support system    Clinic Contact:  Phone number:    Contact 1:  Phone number:    Contact 2:  Phone number:    Religion/:  Phone number:    Therapist:  Phone number:    Local crisis center:    Phone number:    Other community support:  Phone number:

## 2018-07-12 NOTE — PROGRESS NOTES
SUBJECTIVE:   CC: Saranya Zaman is an 28 year old woman who presents for preventive health visit.     Physical   Annual:     Getting at least 3 servings of Calcium per day:  Yes    Bi-annual eye exam:  NO    Dental care twice a year:  NO    Sleep apnea or symptoms of sleep apnea:  None    Diet:  Regular (no restrictions)    Frequency of exercise:  1 day/week    Duration of exercise:  15-30 minutes    Taking medications regularly:  Yes    Medication side effects:  Not applicable    Additional concerns today:  YES        Check moles    Today's PHQ-2 Score:   PHQ-2 ( 1999 [employer]) 7/12/2018   Q1: Little interest or pleasure in doing things 0   Q2: Feeling down, depressed or hopeless 0   PHQ-2 Score 0   Q1: Little interest or pleasure in doing things Not at all   Q2: Feeling down, depressed or hopeless Not at all   PHQ-2 Score 0       Abuse: Current or Past(Physical, Sexual or Emotional)- No  Do you feel safe in your environment - Yes    Social History   Substance Use Topics     Smoking status: Never Smoker     Smokeless tobacco: Never Used     Alcohol use No     Alcohol Use 7/12/2018   If you drink alcohol do you typically have greater than 3 drinks per day OR greater than 7 drinks per week? No   No flowsheet data found.    Reviewed orders with patient.  Reviewed health maintenance and updated orders accordingly - Yes  BP Readings from Last 3 Encounters:   07/12/18 127/82   08/14/17 124/80   07/10/17 115/78    Wt Readings from Last 3 Encounters:   07/12/18 146 lb (66.2 kg)   08/14/17 136 lb (61.7 kg)   07/10/17 135 lb (61.2 kg)                  Patient Active Problem List   Diagnosis     Chronic rhinitis     Fibroadenoma of breast     CARDIOVASCULAR SCREENING; LDL GOAL LESS THAN 160     Contraception     Papanicolaou smear of cervix with atypical squamous cells of undetermined significance (ASC-US)     Intermittent asthma, uncomplicated     Past Surgical History:   Procedure Laterality Date     BIOPSY OF  BREAST, INCISIONAL  04/17/2008    left fibroadenoma     LUMPECTOMY BREAST  3/11    right      SURGICAL HISTORY OF -   2010    wisdom teeth       Social History   Substance Use Topics     Smoking status: Never Smoker     Smokeless tobacco: Never Used     Alcohol use No     Family History   Problem Relation Age of Onset     Hypertension Mother      Arthritis Mother      Prostate Cancer Father      Hyperlipidemia Father      Osteoperosis Maternal Grandmother      Cancer Maternal Grandfather      Cerebrovascular Disease Maternal Grandfather      70's, smoker     Cardiovascular Paternal Grandmother      Diabetes No family hx of      Breast Cancer No family hx of      Colon Cancer No family hx of      Thyroid Disease No family hx of      Genetic Disorder No family hx of          Current Outpatient Prescriptions   Medication Sig Dispense Refill     albuterol (PROAIR HFA/PROVENTIL HFA/VENTOLIN HFA) 108 (90 Base) MCG/ACT Inhaler Inhale 2 puffs into the lungs every 6 hours 1 Inhaler 3     fexofenadine (ALLEGRA) 60 MG tablet Take 1 tablet by mouth 2 times daily. Will need office visit for further refills 60 tablet 0     norethindrone-ethinyl estradiol (MICROGESTIN FE 1/20) 1-20 MG-MCG per tablet Take 1 tablet by mouth daily 90 tablet 3     valACYclovir (VALTREX) 500 MG tablet TAKE 2 TABLETS BY MOUTH TWICE DAILY FOR 3 DAYS AT ONSET OF SYMPTOMS OF OUTBREAK. 45 tablet 1     [DISCONTINUED] albuterol (PROAIR HFA, PROVENTIL HFA, VENTOLIN HFA) 108 (90 BASE) MCG/ACT inhaler Inhale 2 puffs into the lungs every 6 hours (Patient not taking: Reported on 7/12/2018) 1 Inhaler 3     [DISCONTINUED] norethindrone-ethinyl estradiol (MICROGESTIN FE 1/20) 1-20 MG-MCG per tablet Take 1 tablet by mouth daily 90 tablet 3     [DISCONTINUED] valACYclovir (VALTREX) 500 MG tablet TAKE 2 TABLETS BY MOUTH TWICE DAILY FOR 3 DAYS AT ONSET OF SYMPTOMS OF OUTBREAK. 24 tablet 0       Mammogram not appropriate for this patient based on age.    Pertinent  mammograms are reviewed under the imaging tab.  History of abnormal Pap smear:   Last 3 Pap and HPV Results:   PAP / HPV Latest Ref Rng & Units 7/10/2017 7/8/2016 6/1/2015   PAP - ASC-US(A) NIL ASC-US(A)   HPV 16 DNA NEG Negative - -   HPV 18 DNA NEG Negative - -   OTHER HR HPV NEG Positive(A) - -     PAP / HPV Latest Ref Rng & Units 7/10/2017 7/8/2016 6/1/2015   PAP - ASC-US(A) NIL ASC-US(A)   HPV 16 DNA NEG Negative - -   HPV 18 DNA NEG Negative - -   OTHER HR HPV NEG Positive(A) - -     Reviewed and updated as needed this visit by clinical staff  Tobacco  Allergies  Meds  Med Hx  Surg Hx  Fam Hx  Soc Hx        Reviewed and updated as needed this visit by Provider        Past Medical History:   Diagnosis Date     Allergic rhinitis      ASCUS with positive high risk HPV cervical 07/10/2017    Not 16/18     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      Past Surgical History:   Procedure Laterality Date     BIOPSY OF BREAST, INCISIONAL  04/17/2008    left fibroadenoma     LUMPECTOMY BREAST  3/11    right      SURGICAL HISTORY OF -   2010    wisdom teeth       Review of Systems  CONSTITUTIONAL: NEGATIVE for fever, chills, change in weight  INTEGUMENTARU/SKIN: NEGATIVE for worrisome rashes, moles or lesions  EYES: NEGATIVE for vision changes or irritation  ENT: NEGATIVE for ear, mouth and throat problems  RESP: NEGATIVE for significant cough or SOB  BREAST: NEGATIVE for masses, tenderness or discharge  CV: NEGATIVE for chest pain, palpitations or peripheral edema  GI: NEGATIVE for nausea, abdominal pain, heartburn, or change in bowel habits  : NEGATIVE for unusual urinary or vaginal symptoms. Periods are regular.  MUSCULOSKELETAL: NEGATIVE for significant arthralgias or myalgia  NEURO: NEGATIVE for weakness, dizziness or paresthesias  ENDOCRINE: NEGATIVE for  "temperature intolerance, skin/hair changes  PSYCHIATRIC: NEGATIVE for changes in mood or affect     OBJECTIVE:   /82  Pulse 74  Temp 98.1  F (36.7  C) (Oral)  Resp 14  Ht 5' 6\" (1.676 m)  Wt 146 lb (66.2 kg)  SpO2 100%  BMI 23.57 kg/m2  Physical Exam  GENERAL: healthy, alert and no distress  EYES: Eyes grossly normal to inspection, PERRL and conjunctivae and sclerae normal  HENT: ear canals and TM's normal, nose and mouth without ulcers or lesions  NECK: no adenopathy, no asymmetry, masses, or scars and thyroid normal to palpation  RESP: lungs clear to auscultation - no rales, rhonchi or wheezes  BREAST: normal without masses, tenderness or nipple discharge and no palpable axillary masses or adenopathy  CV: regular rate and rhythm, normal S1 S2, no S3 or S4, no murmur, click or rub, no peripheral edema and peripheral pulses strong  ABDOMEN: soft, nontender, no hepatosplenomegaly, no masses and bowel sounds normal   (female): normal female external genitalia, normal urethral meatus, vaginal mucosa pink, moist, well rugated, and normal cervix/adnexa/uterus without masses or discharge  MS: no gross musculoskeletal defects noted, no edema  SKIN: no suspicious lesions or rashes  NEURO: Normal strength and tone, mentation intact and speech normal  PSYCH: mentation appears normal, affect normal/bright    Diagnostic Test Results:  none     ASSESSMENT/PLAN:   1. Routine general medical examination at a health care facility  Health maintenance reviewed and updated.      2. Encounter for surveillance of contraceptive pills  Stable. Refills given  - norethindrone-ethinyl estradiol (MICROGESTIN FE 1/20) 1-20 MG-MCG per tablet; Take 1 tablet by mouth daily  Dispense: 90 tablet; Refill: 3    3. Cold sore  Refills given  Used as needed.   - valACYclovir (VALTREX) 500 MG tablet; TAKE 2 TABLETS BY MOUTH TWICE DAILY FOR 3 DAYS AT ONSET OF SYMPTOMS OF OUTBREAK.  Dispense: 45 tablet; Refill: 1    4. Intermittent asthma, " "uncomplicated  She was using the steroid inhaler have a bout of bronchitis. She was taking it for a few months and now has not needed it. She will continue to use the albuterol as needed.   - albuterol (PROAIR HFA/PROVENTIL HFA/VENTOLIN HFA) 108 (90 Base) MCG/ACT Inhaler; Inhale 2 puffs into the lungs every 6 hours  Dispense: 1 Inhaler; Refill: 3    5. Need for Tdap vaccination  - TDAP, IM (10 - 64 YRS) - Adacel    6. Papanicolaou smear of cervix with atypical squamous cells of undetermined significance (ASC-US)  - Pap imaged thin layer diagnostic with HPV (select HPV order below)  - HPV High Risk Types DNA Cervical    COUNSELING:  Reviewed preventive health counseling, as reflected in patient instructions       Regular exercise       Healthy diet/nutrition       Contraception    BP Readings from Last 1 Encounters:   07/12/18 127/82     Estimated body mass index is 23.57 kg/(m^2) as calculated from the following:    Height as of this encounter: 5' 6\" (1.676 m).    Weight as of this encounter: 146 lb (66.2 kg).           reports that she has never smoked. She has never used smokeless tobacco.      Counseling Resources:  ATP IV Guidelines  Pooled Cohorts Equation Calculator  Breast Cancer Risk Calculator  FRAX Risk Assessment  ICSI Preventive Guidelines  Dietary Guidelines for Americans, 2010  USDA's MyPlate  ASA Prophylaxis  Lung CA Screening    Kristen M. Kehr, PA-C  [facility]  "

## 2018-07-12 NOTE — TELEPHONE ENCOUNTER
Rx: Proventil HFA INH      Pharmacy note: Drug not covered by patient plan. The preferred alternative is Ventolin HFA. Please call/fax the pharmacy to change medication along with strength, directions, quantity, and refills.         Sarwat CASTLE MA

## 2018-07-13 ASSESSMENT — ASTHMA QUESTIONNAIRES: ACT_TOTALSCORE: 25

## 2018-07-17 LAB
COPATH REPORT: ABNORMAL
PAP: ABNORMAL

## 2018-07-18 LAB
FINAL DIAGNOSIS: ABNORMAL
HPV HR 12 DNA CVX QL NAA+PROBE: POSITIVE
HPV16 DNA SPEC QL NAA+PROBE: NEGATIVE
HPV18 DNA SPEC QL NAA+PROBE: NEGATIVE
SPECIMEN DESCRIPTION: ABNORMAL
SPECIMEN SOURCE CVX/VAG CYTO: ABNORMAL

## 2018-07-24 ENCOUNTER — TELEPHONE (OUTPATIENT)
Dept: OBGYN | Facility: CLINIC | Age: 28
End: 2018-07-24

## 2018-09-06 ENCOUNTER — OFFICE VISIT (OUTPATIENT)
Dept: OBGYN | Facility: CLINIC | Age: 28
End: 2018-09-06
Payer: COMMERCIAL

## 2018-09-06 VITALS
DIASTOLIC BLOOD PRESSURE: 84 MMHG | OXYGEN SATURATION: 99 % | HEART RATE: 82 BPM | WEIGHT: 150.2 LBS | TEMPERATURE: 97.8 F | SYSTOLIC BLOOD PRESSURE: 126 MMHG | BODY MASS INDEX: 24.24 KG/M2

## 2018-09-06 DIAGNOSIS — R87.612 PAPANICOLAOU SMEAR OF CERVIX WITH LOW GRADE SQUAMOUS INTRAEPITHELIAL LESION (LGSIL): ICD-10-CM

## 2018-09-06 PROCEDURE — 88341 IMHCHEM/IMCYTCHM EA ADD ANTB: CPT | Performed by: OBSTETRICS & GYNECOLOGY

## 2018-09-06 PROCEDURE — 88305 TISSUE EXAM BY PATHOLOGIST: CPT | Performed by: OBSTETRICS & GYNECOLOGY

## 2018-09-06 PROCEDURE — 57455 BIOPSY OF CERVIX W/SCOPE: CPT | Performed by: OBSTETRICS & GYNECOLOGY

## 2018-09-06 PROCEDURE — 99213 OFFICE O/P EST LOW 20 MIN: CPT | Mod: 25 | Performed by: OBSTETRICS & GYNECOLOGY

## 2018-09-06 PROCEDURE — 88342 IMHCHEM/IMCYTCHM 1ST ANTB: CPT | Performed by: OBSTETRICS & GYNECOLOGY

## 2018-09-06 NOTE — MR AVS SNAPSHOT
After Visit Summary   9/6/2018    Saranya Zaman    MRN: [id number]           Patient Information     Date Of Birth          1990        Visit Information        Provider Department      9/6/2018 8:45 AM [facility]        Today's Diagnoses     Papanicolaou smear of cervix with low grade squamous intraepithelial lesion (LGSIL)           Follow-ups after your visit        Who to contact     If you have questions or need follow up information about today's clinic visit or your schedule please contact [facility] directly at [phone].  Normal or non-critical lab and imaging results will be communicated to you by MyChart, letter or phone within 4 business days after the clinic has received the results. If you do not hear from us within 7 days, please contact the clinic through MyChart or phone. If you have a critical or abnormal lab result, we will notify you by phone as soon as possible.  Submit refill requests through [employer] or call your pharmacy and they will forward the refill request to us. Please allow 3 business days for your refill to be completed.          Additional Information About Your Visit        Care EveryWhere ID     This is your Care EveryWhere ID. This could be used by other organizations to access your [place] medical records  [id number]        Your Vitals Were     Pulse Temperature Pulse Oximetry BMI (Body Mass Index)          82 97.8  F (36.6  C) (Oral) 99% 24.24 kg/m2         Blood Pressure from Last 3 Encounters:   09/06/18 126/84   07/12/18 127/82   08/14/17 124/80    Weight from Last 3 Encounters:   09/06/18 150 lb 3.2 oz (68.1 kg)   07/12/18 146 lb (66.2 kg)   08/14/17 136 lb (61.7 kg)              We Performed the Following     COLP CERVIX/UPPER VAGINA W BX CERVIX/ENDOCERV CURETT     Surgical pathology exam        Primary Care Provider Office Phone # Fax #    Kristen M Kehr, PA-C  [phone]       [address] [facility] [address]        Equal Access to Services     ANAND HUMPHREY : Hadii aad ku hadchetan Weber, waarchieda lujose l, qaybta kaalmada parviz, mamadou brown trevsandy jaime laViridianaderic vinson. So [facility] [phone].    ATENCIÓN: Si habla español, tiene a hess disposición servicios gratuitos de asistencia lingüística. Llame al [phone].    We comply with applicable federal civil rights laws and [place] laws. We do not discriminate on the basis of race, color, national origin, age, disability, sex, sexual orientation, or gender identity.            Thank you!     Thank you for choosing [facility]  for your care. Our goal is always to provide you with excellent care. Hearing back from our patients is one way we can continue to improve our services. Please take a few minutes to complete the written survey that you may receive in the mail after your visit with us. Thank you!             Your Updated Medication List - Protect others around you: Learn how to safely use, store and throw away your medicines at www.disposemymeds.org.          This list is accurate as of 9/6/18 11:59 PM.  Always use your most recent med list.                   Brand Name Dispense Instructions for use Diagnosis    albuterol 108 (90 Base) MCG/ACT inhaler    PROAIR HFA/PROVENTIL HFA/VENTOLIN HFA    1 Inhaler    Inhale 2 puffs into the lungs every 6 hours    Intermittent asthma, uncomplicated       fexofenadine 60 MG tablet    ALLEGRA    60 tablet    Take 1 tablet by mouth 2 times daily. Will need office visit for further refills    Chronic rhinitis       norethindrone-ethinyl estradiol 1-20 MG-MCG per tablet    MICROGESTIN FE 1/20    90 tablet    Take 1 tablet by mouth daily    Encounter for surveillance of contraceptive pills       valACYclovir 500 MG tablet    VALTREX    45 tablet    TAKE 2 TABLETS BY MOUTH TWICE DAILY FOR 3 DAYS AT ONSET OF SYMPTOMS OF OUTBREAK.    Cold sore

## 2018-09-06 NOTE — PROGRESS NOTES
Saranya presents for colposcopy. Pap showed: LSIL.     PMH/PSH/Meds/Allergies reviewed & documented in [facility].    I discussed with the patient the results of her pap smear and/or HPV studies.    I discussed our current understanding of abnormal cytology and the role hpv can play in pre-cancerous cervical change.  I explained the current cytological/histologic terminology.      We discussed the screening nature of pap smears and the need for a more definitive examination.    She is given the opportunity to ask questions and have them answered.  She does agree to proceed with colposcopy.    Procedure for colposcopy and biopsy has been explained to the   patient and consent obtained.    PROCEDURE:  Speculum placed in vagina and excellent visualization of cervix achieved, cervix swabbed  with acetic acid solution.    FINDINGS:  Cervix: no visible lesions, no mosaicism, no punctation, no abnormal vasculature and acetowhite lesion(s) noted at 9 o'clock; SCJ visualized - lesion at 9 o'clock, cervical biopsies taken at 9 o'clock, specimen labelled and sent to pathology and hemostasis achieved with silver nitrate.    Vaginal inspection: vaginal colposcopy not performed.    Vulvar colposcopy: vulvar colposcopy not performed.    Procedure Summary: Patient tolerated procedure well and colposcopy adequate.    Colposcopic Impression: HPV effect    10-15 minutes were spent in face to face discussion regarding her pap and HPV status.  This was in addition to the time required for the procedure.     Plan: Specimens labelled and sent to pathology., Will base further treatment on pathology findings. and treatment options discussed with patient.    David Weber MD FACOG

## 2018-09-13 LAB — COPATH REPORT: NORMAL

## 2018-09-24 ENCOUNTER — OFFICE VISIT (OUTPATIENT)
Dept: OBGYN | Facility: CLINIC | Age: 28
End: 2018-09-24
Payer: COMMERCIAL

## 2018-09-24 VITALS
SYSTOLIC BLOOD PRESSURE: 118 MMHG | HEART RATE: 75 BPM | DIASTOLIC BLOOD PRESSURE: 79 MMHG | WEIGHT: 149 LBS | BODY MASS INDEX: 24.05 KG/M2 | TEMPERATURE: 98.4 F

## 2018-09-24 DIAGNOSIS — D06.9 CIN III WITH SEVERE DYSPLASIA: ICD-10-CM

## 2018-09-24 PROCEDURE — 88307 TISSUE EXAM BY PATHOLOGIST: CPT | Performed by: OBSTETRICS & GYNECOLOGY

## 2018-09-24 PROCEDURE — 88341 IMHCHEM/IMCYTCHM EA ADD ANTB: CPT | Performed by: OBSTETRICS & GYNECOLOGY

## 2018-09-24 PROCEDURE — 57520 CONIZATION OF CERVIX: CPT | Performed by: OBSTETRICS & GYNECOLOGY

## 2018-09-24 PROCEDURE — 88342 IMHCHEM/IMCYTCHM 1ST ANTB: CPT | Performed by: OBSTETRICS & GYNECOLOGY

## 2018-09-24 NOTE — PATIENT INSTRUCTIONS
If you have any questions regarding your visit, Please contact your care team.    Women s Health CLINIC HOURS TELEPHONE NUMBER   MD Buffy Huynh CMA Lisa -    OPHELIA Garcia       Monday:       7:30-4:30 [place]  Wednesday:       7:30-4:30 [place]  Thursday:       7:30-1:30 [place]  Friday:       7:30-11:30 [facility]  [address] [place]  [address]  [phone] ask for Women's [facility]  [address] [place] [address]  [phone] ask for [facility]    Imaging Scheduling for [place]:  [phone]    Imaging Scheduling for [place]: [phone]       Urgent Care locations:    [facility] Saturday and Sunday   9 am - 5 pm    Monday-Friday   12 pm - 8 pm  Saturday and Sunday   9 am - 5 pm   [phone]     [facility] Labor and Delivery:  [phone]    If you need a medication refill, please contact your pharmacy. Please allow 3 business days for your refill to be completed.  As always, Thank you for trusting us with your healthcare needs!

## 2018-09-24 NOTE — PROGRESS NOTES
Saranya Zaman is a 28 year old   who presents today  for LEEP (Loop Electrical Excision Procedure) due to ASUNCION 3 on biopsy.  The LEEP procedure was explained. Discussed possible risks including cervical incompetence, infection, bleeding, discomfort, and possible incomplete excision of the abnormal tissue so close follow up was necessary. Consent was obtained and all questions were answered.      Procedure: Patient was placed in the lithotomy position and the grounding pad was placed on her leg. A coated speculum was used to position the cervix in the midline position.   Using 1% lidocaine with epinephrine, intracervical injections were performed.  A total of 10 cc was used.  After adequate anesthesia, a 15 mm loop electrode was used to excise the abnormal portion of the cervix.  An ECC was not performed after excision of the transformation zone.  The 5 mm ball cautery was used to fulgurate the cut surface. With bleeding  well controlled, Monsels solution was applied to ensure hemostasis. The specimen(s) was/were labeled Leep [facility] .  The patient tolerated the procedure well.    A:  LEEP for ASUNCION 3    P:   Post-LEEP instructions were given to the patient.  She was told to expect heavy discharge for the first 4-7 days and could continue for 2 weeks. Nothing in the vagina and no intercourse for 4-6 weeks.  No heavy lifting for next few days.     Return for any signs of infection or heavy bleeding.  Follow up for pap smear in 6 months, then pap and HPV in 12 months.

## 2018-09-24 NOTE — MR AVS SNAPSHOT
After Visit Summary   9/24/2018    Saranya Zaman    MRN: [id number]           Patient Information     Date Of Birth          1990        Visit Information        Provider Department      9/24/2018 10:15 AM David Weber MD; [place] PROCEDURE ROOM 2 [facility]        Today's Diagnoses     ASUNCION III with severe dysplasia          Care Instructions                                                         If you have any questions regarding your visit, Please contact your care team.    Women s Health CLINIC HOURS TELEPHONE NUMBER   MD Buffy Huynh CMA Lisa -    OPHELIA Garcia       Monday:       7:30-4:30 [place]  Wednesday:       7:30-4:30 [place]  Thursday:       7:30-1:30 [place]  Friday:       7:30-11:30 [facility]  [address] [place]  [address]  [phone] ask for [facility]  [address] [place] [address]  [phone] ask for [facility]    Imaging Scheduling for [place]:  [phone]    Imaging Scheduling for [place]: [phone]       Urgent Care locations:    [facility] Saturday and Sunday   9 am - 5 pm    Monday-Friday   12 pm - 8 pm  Saturday and Sunday   9 am - 5 pm   [phone]     [facility] Labor and Delivery:  [phone]    If you need a medication refill, please contact your pharmacy. Please allow 3 business days for your refill to be completed.  As always, Thank you for trusting us with your healthcare needs!              Follow-ups after your visit        Who to contact     If you have questions or need follow up information about today's clinic visit or your schedule please contact [facility] directly at [phone].  Normal or non-critical lab and imaging results will be communicated to you by MyChart, letter or phone within 4 business days after the clinic has  received the results. If you do not hear from us within 7 days, please contact the clinic through [employer] or phone. If you have a critical or abnormal lab result, we will notify you by phone as soon as possible.  Submit refill requests through [employer] or call your pharmacy and they will forward the refill request to us. Please allow 3 business days for your refill to be completed.          Additional Information About Your Visit        Care EveryWhere ID     This is your Care EveryWhere ID. This could be used by other organizations to access your [place] medical records  [id number]        Your Vitals Were     Pulse Temperature Breastfeeding? BMI (Body Mass Index)          75 98.4  F (36.9  C) (Tympanic) No 24.05 kg/m2         Blood Pressure from Last 3 Encounters:   09/24/18 118/79   09/06/18 126/84   07/12/18 127/82    Weight from Last 3 Encounters:   09/24/18 149 lb (67.6 kg)   09/06/18 150 lb 3.2 oz (68.1 kg)   07/12/18 146 lb (66.2 kg)              We Performed the Following     COLP CERVIX/UPPER VAGINA W LOOP ELEC BX CERVIX     Surgical pathology exam        Primary Care Provider Office Phone # Fax #    Kristen M Kehr, PA-C [phone] [address] [facility] [address]        Equal Access to Services     ANAND HUMPHREY : Hadii aad ku hadasho Soomaali, waaxda luqadaha, qaybta kaalmada adeegyada, waxay idiin hayaan mary nunez . So [facility] [phone].    ATENCIÓN: Si habla español, tiene a hess disposición servicios gratuitos de asistencia lingüística. Llame al [phone].    We comply with applicable federal civil rights laws and [place] laws. We do not discriminate on the basis of race, color, national origin, age, disability, sex, sexual orientation, or gender identity.            Thank you!     Thank you for choosing [facility]  for your care. Our goal is always to provide you with excellent care. Hearing back from our patients is one way we can continue to  improve our services. Please take a few minutes to complete the written survey that you may receive in the mail after your visit with us. Thank you!             Your Updated Medication List - Protect others around you: Learn how to safely use, store and throw away your medicines at www.disposemymeds.org.          This list is accurate as of 9/24/18 10:48 AM.  Always use your most recent med list.                   Brand Name Dispense Instructions for use Diagnosis    albuterol 108 (90 Base) MCG/ACT inhaler    PROAIR HFA/PROVENTIL HFA/VENTOLIN HFA    1 Inhaler    Inhale 2 puffs into the lungs every 6 hours    Intermittent asthma, uncomplicated       fexofenadine 60 MG tablet    ALLEGRA    60 tablet    Take 1 tablet by mouth 2 times daily. Will need office visit for further refills    Chronic rhinitis       norethindrone-ethinyl estradiol 1-20 MG-MCG per tablet    MICROGESTIN FE 1/20    90 tablet    Take 1 tablet by mouth daily    Encounter for surveillance of contraceptive pills       valACYclovir 500 MG tablet    VALTREX    45 tablet    TAKE 2 TABLETS BY MOUTH TWICE DAILY FOR 3 DAYS AT ONSET OF SYMPTOMS OF OUTBREAK.    Cold sore

## 2018-09-30 LAB — COPATH REPORT: NORMAL

## 2019-01-10 ENCOUNTER — OFFICE VISIT (OUTPATIENT)
Dept: FAMILY MEDICINE | Facility: CLINIC | Age: 29
End: 2019-01-10
Payer: COMMERCIAL

## 2019-01-10 VITALS
TEMPERATURE: 98.3 F | DIASTOLIC BLOOD PRESSURE: 76 MMHG | HEART RATE: 82 BPM | SYSTOLIC BLOOD PRESSURE: 130 MMHG | OXYGEN SATURATION: 97 % | WEIGHT: 148 LBS | RESPIRATION RATE: 14 BRPM | BODY MASS INDEX: 23.89 KG/M2

## 2019-01-10 DIAGNOSIS — N87.1 MODERATE DYSPLASIA OF CERVIX (CIN II): Primary | ICD-10-CM

## 2019-01-10 PROCEDURE — 99213 OFFICE O/P EST LOW 20 MIN: CPT | Performed by: PHYSICIAN ASSISTANT

## 2019-01-10 PROCEDURE — 88175 CYTOPATH C/V AUTO FLUID REDO: CPT | Performed by: PHYSICIAN ASSISTANT

## 2019-01-10 ASSESSMENT — PAIN SCALES - GENERAL: PAINLEVEL: NO PAIN (0)

## 2019-01-10 NOTE — PROGRESS NOTES
SUBJECTIVE:   Saranya Zaman is a 29 year old female who presents to clinic today for the following health issues:      Follow up leep/pap smear.   She was contacted by Pap results nurse and told to return for Pap.   She had a LEEP procedure in Sept.   According to result notes she is due for Pap only in 3 months.       Problem list and histories reviewed & adjusted, as indicated.  Additional history: as documented    Patient Active Problem List   Diagnosis     Chronic rhinitis     Fibroadenoma of breast     CARDIOVASCULAR SCREENING; LDL GOAL LESS THAN 160     Contraception     Moderate dysplasia of cervix (ASUNCION II)     Intermittent asthma, uncomplicated     Past Surgical History:   Procedure Laterality Date     BIOPSY OF BREAST, INCISIONAL  04/17/2008    left fibroadenoma     LUMPECTOMY BREAST  3/11    right      SURGICAL HISTORY OF -   2010    wisdom teeth       Social History     Tobacco Use     Smoking status: Never Smoker     Smokeless tobacco: Never Used   Substance Use Topics     Alcohol use: No     Family History   Problem Relation Age of Onset     Hypertension Mother      Arthritis Mother      Prostate Cancer Father      Hyperlipidemia Father      Lymphoma Father 73     Osteoporosis Maternal Grandmother      Cancer Maternal Grandfather      Cerebrovascular Disease Maternal Grandfather         70's, smoker     Cardiovascular Paternal Grandmother      Diabetes No family hx of      Breast Cancer No family hx of      Colon Cancer No family hx of      Thyroid Disease No family hx of      Genetic Disorder No family hx of          Current Outpatient Medications   Medication Sig Dispense Refill     albuterol (PROAIR HFA/PROVENTIL HFA/VENTOLIN HFA) 108 (90 Base) MCG/ACT Inhaler Inhale 2 puffs into the lungs every 6 hours 1 Inhaler 3     fexofenadine (ALLEGRA) 60 MG tablet Take 1 tablet by mouth 2 times daily. Will need office visit for further refills 60 tablet 0     norethindrone-ethinyl estradiol  (MICROGESTIN FE 1/20) 1-20 MG-MCG per tablet Take 1 tablet by mouth daily 90 tablet 3     valACYclovir (VALTREX) 500 MG tablet TAKE 2 TABLETS BY MOUTH TWICE DAILY FOR 3 DAYS AT ONSET OF SYMPTOMS OF OUTBREAK. 45 tablet 1     Allergies   Allergen Reactions     Cats      Dogs      Dust Mites      Grass      Horse Allergy      Mold      Pollen [Pollen Extract]        Reviewed and updated as needed this visit by clinical staff       Reviewed and updated as needed this visit by Provider         ROS:  Constitutional, HEENT, cardiovascular, pulmonary, GI, , musculoskeletal, neuro, skin, endocrine and psych systems are negative, except as otherwise noted.    OBJECTIVE:     /76   Pulse 82   Temp 98.3  F (36.8  C) (Oral)   Resp 14   Wt 67.1 kg (148 lb)   SpO2 97%   BMI 23.89 kg/m    Body mass index is 23.89 kg/m .  GENERAL: healthy, alert and no distress   (female): normal female external genitalia, normal urethral meatus, vaginal mucosa, normal cervix/adnexa/uterus without masses or discharge    Diagnostic Test Results:  none     ASSESSMENT/PLAN:       1. Moderate dysplasia of cervix (ASUNCION II)  Pap only completed today.   After results are back I will need to consult with Dr. Weber to determine follow up plan.   - Pap imaged thin layer diagnostic only        Kristen M. Kehr, PA-C  [facility]

## 2019-01-10 NOTE — LETTER
My Asthma Action Plan  Name: Saranya Zaman   YOB: 1990  Date: 1/10/2019   My doctor: Kristen M. Kehr, PA-C   My clinic: [facility]        My Control Medicine: None  My Rescue Medicine: Albuterol (Proair/Ventolin/Proventil) inhaler as needed   My Asthma Severity: intermittent  Avoid your asthma triggers: upper respiratory infections               GREEN ZONE   Good Control    I feel good    No cough or wheeze    Can work, sleep and play without asthma symptoms       Take your asthma control medicine every day.     1. If exercise triggers your asthma, take your rescue medication    15 minutes before exercise or sports, and    During exercise if you have asthma symptoms  2. Spacer to use with inhaler: If you have a spacer, make sure to use it with your inhaler             YELLOW ZONE Getting Worse  I have ANY of these:    I do not feel good    Cough or wheeze    Chest feels tight    Wake up at night   1. Keep taking your Green Zone medications  2. Start taking your rescue medicine:    every 20 minutes for up to 1 hour. Then every 4 hours for 24-48 hours.  3. If you stay in the Yellow Zone for more than 12-24 hours, contact your doctor.  4. If you do not return to the Green Zone in 12-24 hours or you get worse, start taking your oral steroid medicine if prescribed by your provider.           RED ZONE Medical Alert - Get Help  I have ANY of these:    I feel awful    Medicine is not helping    Breathing getting harder    Trouble walking or talking    Nose opens wide to breathe       1. Take your rescue medicine NOW  2. If your provider has prescribed an oral steroid medicine, start taking it NOW  3. Call your doctor NOW  4. If you are still in the Red Zone after 20 minutes and you have not reached your doctor:    Take your rescue medicine again and    Call 911 or go to the emergency room right away    See your regular doctor within 2 weeks of an Emergency Room or Urgent Care visit for  follow-up treatment.          Annual Reminders:  Meet with Asthma Educator,  Flu Shot in the Fall, consider Pneumonia Vaccination for patients with asthma (aged 19 and older).    Pharmacy:    [id number] - [place], [place] - [address]  [id number] - [place], [place] - [address]  [id number] [place] - [address]  [facility] - [place], [place] - [address]  [id number] - [place], [place] - [address]                      Asthma Triggers  How To Control Things That Make Your Asthma Worse    Triggers are things that make your asthma worse.  Look at the list below to help you find your triggers and what you can do about them.  You can help prevent asthma flare-ups by staying away from your triggers.      Trigger                                                          What you can do   Cigarette Smoke  Tobacco smoke can make asthma worse. Do not allow smoking in your home, car or around you.  Be sure no one smokes at a child s day care or school.  If you smoke, ask your health care provider for ways to help you quit.  Ask family members to quit too.  Ask your health care provider for a referral to Quit Plan to help you quit smoking, or call 9-787-992-PLAN.     Colds, Flu, Bronchitis  These are common triggers of asthma. Wash your hands often.  Don t touch your eyes, nose or mouth.  Get a flu shot every year.     Dust Mites  These are tiny bugs that live in cloth or carpet. They are too small to see. Wash sheets and blankets in hot water every week.   Encase pillows and mattress in dust mite proof covers.  Avoid having carpet if you can. If you have carpet, vacuum weekly.   Use a dust mask and HEPA vacuum.   Pollen and Outdoor Mold  Some people are allergic to trees,  grass, or weed pollen, or molds. Try to keep your windows closed.  Limit time out doors when pollen count is high.   Ask you health care provider about taking medicine during allergy season.     Animal Dander  Some people are allergic to skin flakes, urine or saliva from pets with fur or feathers. Keep pets with fur or feathers out of your home.    If you can t keep the pet outdoors, then keep the pet out of your bedroom.  Keep the bedroom door closed.  Keep pets off cloth furniture and away from stuffed toys.     Mice, Rats, and Cockroaches  Some people are allergic to the waste from these pests.   Cover food and garbage.  Clean up spills and food crumbs.  Store grease in the refrigerator.   Keep food out of the bedroom.   Indoor Mold  This can be a trigger if your home has high moisture. Fix leaking faucets, pipes, or other sources of water.   Clean moldy surfaces.  Dehumidify basement if it is damp and smelly.   Smoke, Strong Odors, and Sprays  These can reduce air quality. Stay away from strong odors and sprays, such as perfume, powder, hair spray, paints, smoke incense, paint, cleaning products, candles and new carpet.   Exercise or Sports  Some people with asthma have this trigger. Be active!  Ask your doctor about taking medicine before sports or exercise to prevent symptoms.    Warm up for 5-10 minutes before and after sports or exercise.     Other Triggers of Asthma  Cold air:  Cover your nose and mouth with a scarf.  Sometimes laughing or crying can be a trigger.  Some medicines and food can trigger asthma.

## 2019-01-10 NOTE — LETTER
January 25, 2019    Saranya Zaman  [address] [place] [address]    Dear ,  This letter is regarding your recent Pap smear (cervical cancer screening) and Human Papillomavirus (HPV) test.  We are happy to inform you that your Pap smear result is normal. Cervical cancer is closely linked with certain types of HPV. Your results showed no evidence of high-risk HPV.  Therefore we recommend you return in 1 year for your next pap smear and HPV test.  You will still need to return to the clinic every year for an annual exam and other preventive tests.  If you have additional questions regarding this result, please call our registered nurse, Lynne at [phone].  Sincerely,    Kristen M. Kehr, PA-C/gege

## 2019-01-10 NOTE — NURSING NOTE
"Chief Complaint   Patient presents with     Repeat Pap Smear       Initial /76   Pulse 82   Temp 98.3  F (36.8  C) (Oral)   Resp 14   Wt 67.1 kg (148 lb)   SpO2 97%   BMI 23.89 kg/m   Estimated body mass index is 23.89 kg/m  as calculated from the following:    Height as of 7/12/18: 1.676 m (5' 6\").    Weight as of this encounter: 67.1 kg (148 lb).  Medication Reconciliation: complete    CLAIRE Vargas MA    "

## 2019-01-10 NOTE — LETTER
My Asthma Action Plan  Name: Saranya Zaman   YOB: 1990  Date: 1/10/2019   My doctor: Kristen M. Kehr, PA-C   My clinic: [facility]        My Control Medicine: { :963603}  My Rescue Medicine: { :988995}  {AAP include Oral Steroid:505216} My Asthma Severity: { :505894}  Avoid your asthma triggers: { :785654}        {Is patient a child or adult?:465096}       GREEN ZONE   Good Control    I feel good    No cough or wheeze    Can work, sleep and play without asthma symptoms       Take your asthma control medicine every day.     1. If exercise triggers your asthma, take your rescue medication    15 minutes before exercise or sports, and    During exercise if you have asthma symptoms  2. Spacer to use with inhaler: If you have a spacer, make sure to use it with your inhaler             YELLOW ZONE Getting Worse  I have ANY of these:    I do not feel good    Cough or wheeze    Chest feels tight    Wake up at night   1. Keep taking your Green Zone medications  2. Start taking your rescue medicine:    every 20 minutes for up to 1 hour. Then every 4 hours for 24-48 hours.  3. If you stay in the Yellow Zone for more than 12-24 hours, contact your doctor.  4. If you do not return to the Green Zone in 12-24 hours or you get worse, start taking your oral steroid medicine if prescribed by your provider.           RED ZONE Medical Alert - Get Help  I have ANY of these:    I feel awful    Medicine is not helping    Breathing getting harder    Trouble walking or talking    Nose opens wide to breathe       1. Take your rescue medicine NOW  2. If your provider has prescribed an oral steroid medicine, start taking it NOW  3. Call your doctor NOW  4. If you are still in the Red Zone after 20 minutes and you have not reached your doctor:    Take your rescue medicine again and    Call 911 or go to the emergency room right away    See your regular doctor within 2 weeks of an Emergency Room or Urgent Care  visit for follow-up treatment.          Annual Reminders:  Meet with Asthma Educator,  Flu Shot in the Fall, consider Pneumonia Vaccination for patients with asthma (aged 19 and older).    Pharmacy:    [id number] - [place], [place] - [address]  [id number] - [place], [place] - [address]  [facility] [place] - [address] [place] [address]  [facility] - [place], [place] - [address]  [facility] - [place], [place] - [address] AT [address]                      Asthma Triggers  How To Control Things That Make Your Asthma Worse    Triggers are things that make your asthma worse.  Look at the list below to help you find your triggers and what you can do about them.  You can help prevent asthma flare-ups by staying away from your triggers.      Trigger                                                          What you can do   Cigarette Smoke  Tobacco smoke can make asthma worse. Do not allow smoking in your home, car or around you.  Be sure no one smokes at a child s day care or school.  If you smoke, ask your health care provider for ways to help you quit.  Ask family members to quit too.  Ask your health care provider for a referral to Quit Plan to help you quit smoking, or call 1-180-146-PLAN.     Colds, Flu, Bronchitis  These are common triggers of asthma. Wash your hands often.  Don t touch your eyes, nose or mouth.  Get a flu shot every year.     Dust Mites  These are tiny bugs that live in cloth or carpet. They are too small to see. Wash sheets and blankets in hot water every week.   Encase pillows and mattress in dust mite proof covers.  Avoid having carpet if you can. If you have carpet, vacuum weekly.   Use a dust mask and HEPA vacuum.   Pollen and Outdoor Mold  Some people are allergic to  trees, grass, or weed pollen, or molds. Try to keep your windows closed.  Limit time out doors when pollen count is high.   Ask you health care provider about taking medicine during allergy season.     Animal Dander  Some people are allergic to skin flakes, urine or saliva from pets with fur or feathers. Keep pets with fur or feathers out of your home.    If you can t keep the pet outdoors, then keep the pet out of your bedroom.  Keep the bedroom door closed.  Keep pets off cloth furniture and away from stuffed toys.     Mice, Rats, and Cockroaches  Some people are allergic to the waste from these pests.   Cover food and garbage.  Clean up spills and food crumbs.  Store grease in the refrigerator.   Keep food out of the bedroom.   Indoor Mold  This can be a trigger if your home has high moisture. Fix leaking faucets, pipes, or other sources of water.   Clean moldy surfaces.  Dehumidify basement if it is damp and smelly.   Smoke, Strong Odors, and Sprays  These can reduce air quality. Stay away from strong odors and sprays, such as perfume, powder, hair spray, paints, smoke incense, paint, cleaning products, candles and new carpet.   Exercise or Sports  Some people with asthma have this trigger. Be active!  Ask your doctor about taking medicine before sports or exercise to prevent symptoms.    Warm up for 5-10 minutes before and after sports or exercise.     Other Triggers of Asthma  Cold air:  Cover your nose and mouth with a scarf.  Sometimes laughing or crying can be a trigger.  Some medicines and food can trigger asthma.

## 2019-01-15 LAB
COPATH REPORT: NORMAL
PAP: NORMAL

## 2019-01-22 NOTE — RESULT ENCOUNTER NOTE
Dr. Weber,   Could you please review the above results on this patient's Pap. You had instructed her to come back in 3 months after the LEEP 9/2018.   Her Pap is NIL 1/10/2019.   What are your recommendations for testing and follow up?   Thank you.   Kristen Kehr PA-C

## 2019-06-10 DIAGNOSIS — Z30.41 ENCOUNTER FOR SURVEILLANCE OF CONTRACEPTIVE PILLS: ICD-10-CM

## 2019-06-10 DIAGNOSIS — B00.1 COLD SORE: ICD-10-CM

## 2019-06-10 RX ORDER — VALACYCLOVIR HYDROCHLORIDE 500 MG/1
TABLET, FILM COATED ORAL
Qty: 15 TABLET | Refills: 0 | Status: SHIPPED | OUTPATIENT
Start: 2019-06-10 | End: 2019-07-15

## 2019-06-10 RX ORDER — NORETHINDRONE ACETATE AND ETHINYL ESTRADIOL AND FERROUS FUMARATE 1MG-20(21)
KIT ORAL
Qty: 28 TABLET | Refills: 0 | Status: SHIPPED | OUTPATIENT
Start: 2019-06-10 | End: 2019-07-15

## 2019-06-10 NOTE — TELEPHONE ENCOUNTER
Prescription approved per [facility]  APPT NEEDED FOR FURTHER REFILLS, physical, cold sores, BCP.  asthma  Health Maintenance Due   Topic Date Due     HIV SCREENING  01/01/2006     PHQ-2  01/01/2019     ASTHMA CONTROL TEST  01/12/2019     Brooke Morton RN

## 2019-06-10 NOTE — LETTER
June 11, 2019    Saranya Zaman  [address] [place] [address]    Dear Saranya,       We recently received a refill request for junel and valacyclovir.  We have refilled this for one time only because you are due for a:    Physical office visit      Please call at your earliest convenience so that there will not be a delay with your future refills.          Thank you,   Your [facility] Team/  [phone]

## 2019-07-15 ENCOUNTER — OFFICE VISIT (OUTPATIENT)
Dept: FAMILY MEDICINE | Facility: CLINIC | Age: 29
End: 2019-07-15
Payer: COMMERCIAL

## 2019-07-15 VITALS
SYSTOLIC BLOOD PRESSURE: 112 MMHG | BODY MASS INDEX: 23.95 KG/M2 | TEMPERATURE: 97.8 F | WEIGHT: 149 LBS | HEART RATE: 78 BPM | HEIGHT: 66 IN | OXYGEN SATURATION: 100 % | DIASTOLIC BLOOD PRESSURE: 74 MMHG

## 2019-07-15 DIAGNOSIS — Z00.00 ROUTINE GENERAL MEDICAL EXAMINATION AT A HEALTH CARE FACILITY: Primary | ICD-10-CM

## 2019-07-15 DIAGNOSIS — J45.20 INTERMITTENT ASTHMA, UNCOMPLICATED: ICD-10-CM

## 2019-07-15 DIAGNOSIS — Z30.41 ENCOUNTER FOR SURVEILLANCE OF CONTRACEPTIVE PILLS: ICD-10-CM

## 2019-07-15 DIAGNOSIS — B00.1 COLD SORE: ICD-10-CM

## 2019-07-15 PROCEDURE — 99395 PREV VISIT EST AGE 18-39: CPT | Performed by: PHYSICIAN ASSISTANT

## 2019-07-15 RX ORDER — ALBUTEROL SULFATE 90 UG/1
2 AEROSOL, METERED RESPIRATORY (INHALATION) EVERY 6 HOURS
Qty: 1 INHALER | Refills: 3 | Status: SHIPPED | OUTPATIENT
Start: 2019-07-15 | End: 2020-01-30

## 2019-07-15 RX ORDER — NORETHINDRONE ACETATE AND ETHINYL ESTRADIOL 1MG-20(21)
1 KIT ORAL DAILY
Qty: 84 TABLET | Refills: 4 | Status: SHIPPED | OUTPATIENT
Start: 2019-07-15 | End: 2020-01-30

## 2019-07-15 RX ORDER — VALACYCLOVIR HYDROCHLORIDE 500 MG/1
TABLET, FILM COATED ORAL
Qty: 30 TABLET | Refills: 3 | Status: SHIPPED | OUTPATIENT
Start: 2019-07-15 | End: 2020-01-30

## 2019-07-15 ASSESSMENT — ENCOUNTER SYMPTOMS
COUGH: 0
SORE THROAT: 0
ABDOMINAL PAIN: 0
CHILLS: 0
HEARTBURN: 0
HEMATOCHEZIA: 0
FEVER: 0
JOINT SWELLING: 0
CONSTIPATION: 0
PARESTHESIAS: 0
BREAST MASS: 0
HEADACHES: 0
DIARRHEA: 0
PALPITATIONS: 0
WEAKNESS: 0
NAUSEA: 0
DIZZINESS: 0
DYSURIA: 0
EYE PAIN: 0
HEMATURIA: 0
NERVOUS/ANXIOUS: 0
FREQUENCY: 0
ARTHRALGIAS: 0
SHORTNESS OF BREATH: 0
MYALGIAS: 0

## 2019-07-15 ASSESSMENT — PAIN SCALES - GENERAL: PAINLEVEL: NO PAIN (0)

## 2019-07-15 ASSESSMENT — MIFFLIN-ST. JEOR: SCORE: 1418.64

## 2019-07-15 NOTE — PROGRESS NOTES
SUBJECTIVE:   CC: Saranya Zaman is an 29 year old woman who presents for preventive health visit.     Healthy Habits:     Getting at least 3 servings of Calcium per day:  Yes    Bi-annual eye exam:  NO    Dental care twice a year:  Yes    Sleep apnea or symptoms of sleep apnea:  None    Diet:  Regular (no restrictions)    Frequency of exercise:  2-3 days/week    Duration of exercise:  45-60 minutes    Taking medications regularly:  Yes    Medication side effects:  None    PHQ-2 Total Score: 0    Additional concerns today:  No        Today's PHQ-2 Score:   PHQ-2 ( 1999 [employer]) 7/15/2019   Q1: Little interest or pleasure in doing things 0   Q2: Feeling down, depressed or hopeless 0   PHQ-2 Score 0   Q1: Little interest or pleasure in doing things Not at all   Q2: Feeling down, depressed or hopeless Not at all   PHQ-2 Score 0       Abuse: Current or Past(Physical, Sexual or Emotional)- No  Do you feel safe in your environment? Yes    Social History     Tobacco Use     Smoking status: Never Smoker     Smokeless tobacco: Never Used   Substance Use Topics     Alcohol use: No     If you drink alcohol do you typically have >3 drinks per day or >7 drinks per week? No    Alcohol Use 7/15/2019   Prescreen: >3 drinks/day or >7 drinks/week? No   Prescreen: >3 drinks/day or >7 drinks/week? -   No flowsheet data found.    Reviewed orders with patient.  Reviewed health maintenance and updated orders accordingly - Yes  BP Readings from Last 3 Encounters:   07/15/19 112/74   01/10/19 130/76   09/24/18 118/79    Wt Readings from Last 3 Encounters:   07/15/19 67.6 kg (149 lb)   01/10/19 67.1 kg (148 lb)   09/24/18 67.6 kg (149 lb)                  Patient Active Problem List   Diagnosis     Chronic rhinitis     Fibroadenoma of breast     CARDIOVASCULAR SCREENING; LDL GOAL LESS THAN 160     Contraception     Moderate dysplasia of cervix (ASUNCION II)     Intermittent asthma, uncomplicated     Past Surgical History:   Procedure  Laterality Date     BIOPSY OF BREAST, INCISIONAL  04/17/2008    left fibroadenoma     LUMPECTOMY BREAST  3/11    right      SURGICAL HISTORY OF -   2010    wisdom teeth       Social History     Tobacco Use     Smoking status: Never Smoker     Smokeless tobacco: Never Used   Substance Use Topics     Alcohol use: No     Family History   Problem Relation Age of Onset     Hypertension Mother      Arthritis Mother      Prostate Cancer Father      Hyperlipidemia Father      Lymphoma Father 73     Osteoporosis Maternal Grandmother      Cancer Maternal Grandfather      Cerebrovascular Disease Maternal Grandfather         70's, smoker     Cardiovascular Paternal Grandmother      Diabetes No family hx of      Breast Cancer No family hx of      Colon Cancer No family hx of      Thyroid Disease No family hx of      Genetic Disorder No family hx of          Current Outpatient Medications   Medication Sig Dispense Refill     albuterol (PROAIR HFA/PROVENTIL HFA/VENTOLIN HFA) 108 (90 Base) MCG/ACT inhaler Inhale 2 puffs into the lungs every 6 hours 1 Inhaler 3     fexofenadine (ALLEGRA) 60 MG tablet Take 1 tablet by mouth 2 times daily. Will need office visit for further refills 60 tablet 0     norethindrone-ethinyl estradiol (JUNEL FE 1/20) 1-20 MG-MCG tablet Take 1 tablet by mouth daily 84 tablet 4     valACYclovir (VALTREX) 500 MG tablet TAKE 2 TABLETS BY MOUTH TWICE DAILY FOR 3 DAYS AT ONSET OF SYMPTOMS OF OUTBREAK 30 tablet 3     Allergies   Allergen Reactions     Cats      Dogs      Dust Mites      Grass      Horse Allergy      Mold      Pollen [Pollen Extract]        Mammogram not appropriate for this patient based on age.    Pertinent mammograms are reviewed under the imaging tab.  History of abnormal Pap smear:   YES - updated in Problem List and Health Maintenance accordingly  Last 3 Pap and HPV Results:   PAP / HPV Latest Ref Rng & Units 1/10/2019 7/12/2018 7/10/2017   PAP - NIL LSIL(A) ASC-US(A)   HPV 16 DNA  NEG:Negative - Negative Negative   HPV 18 DNA NEG:Negative - Negative Negative   OTHER HR HPV NEG:Negative - Positive(A) Positive(A)     PAP / HPV Latest Ref Rng & Units 1/10/2019 7/12/2018 7/10/2017   PAP - NIL LSIL(A) ASC-US(A)   HPV 16 DNA NEG:Negative - Negative Negative   HPV 18 DNA NEG:Negative - Negative Negative   OTHER HR HPV NEG:Negative - Positive(A) Positive(A)     Reviewed and updated as needed this visit by clinical staff  Tobacco  Allergies  Meds  Med Hx  Surg Hx  Fam Hx  Soc Hx        Reviewed and updated as needed this visit by Provider        Past Medical History:   Diagnosis Date     Abnormal Pap smear of cervix 07/12/2018    see problem list     Allergic rhinitis      ASCUS with positive high risk HPV cervical 07/10/2017    Not 16/18     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      Past Surgical History:   Procedure Laterality Date     BIOPSY OF BREAST, INCISIONAL  04/17/2008    left fibroadenoma     LUMPECTOMY BREAST  3/11    right      SURGICAL HISTORY OF -   2010    wisdom teeth       Review of Systems   Constitutional: Negative for chills and fever.   HENT: Negative for congestion, ear pain, hearing loss and sore throat.    Eyes: Negative for pain and visual disturbance.   Respiratory: Negative for cough and shortness of breath.    Cardiovascular: Negative for chest pain, palpitations and peripheral edema.   Gastrointestinal: Negative for abdominal pain, constipation, diarrhea, heartburn, hematochezia and nausea.   Breasts:  Negative for tenderness, breast mass and discharge.   Genitourinary: Negative for dysuria, frequency, genital sores, hematuria, pelvic pain, urgency, vaginal bleeding and vaginal discharge.   Musculoskeletal: Negative for arthralgias, joint swelling and myalgias.   Skin: Negative for rash.   Neurological: Negative for  "dizziness, weakness, headaches and paresthesias.   Psychiatric/Behavioral: Negative for mood changes. The patient is not nervous/anxious.         OBJECTIVE:   Pulse 78   Temp 97.8  F (36.6  C) (Tympanic)   Ht 1.67 m (5' 5.75\")   Wt 67.6 kg (149 lb)   LMP  (LMP Unknown)   SpO2 100%   BMI 24.23 kg/m    Physical Exam  GENERAL: healthy, alert and no distress  EYES: Eyes grossly normal to inspection, PERRL and conjunctivae and sclerae normal  HENT: ear canals and TM's normal, nose and mouth without ulcers or lesions  NECK: no adenopathy, no asymmetry, masses, or scars and thyroid normal to palpation  RESP: lungs clear to auscultation - no rales, rhonchi or wheezes  BREAST: normal without masses, tenderness or nipple discharge and no palpable axillary masses or adenopathy  CV: regular rate and rhythm, normal S1 S2, no S3 or S4, no murmur, click or rub, no peripheral edema and peripheral pulses strong  ABDOMEN: soft, nontender, no hepatosplenomegaly, no masses and bowel sounds normal  MS: no gross musculoskeletal defects noted, no edema  SKIN: no suspicious lesions or rashes  NEURO: Normal strength and tone, mentation intact and speech normal  PSYCH: mentation appears normal, affect normal/bright    Diagnostic Test Results:  Labs reviewed in Epic    ASSESSMENT/PLAN:   1. Routine general medical examination at a health care facility  Health maintenance reviewed and updated.  She is due for a Pap in January 2020 for 1 year after the LEEP procedure last year .   I will see her back at that time, complete her Pap and refill her medications.   Then she will be able to get back on track with annual appointments.     2. Intermittent asthma, uncomplicated  Stable, refills given  - albuterol (PROAIR HFA/PROVENTIL HFA/VENTOLIN HFA) 108 (90 Base) MCG/ACT inhaler; Inhale 2 puffs into the lungs every 6 hours  Dispense: 1 Inhaler; Refill: 3    3. Encounter for surveillance of contraceptive pills  Stable, refills given  - " "norethindrone-ethinyl estradiol (JUNEL FE 1/20) 1-20 MG-MCG tablet; Take 1 tablet by mouth daily  Dispense: 84 tablet; Refill: 4    4. Cold sore  Stable, refills given  - valACYclovir (VALTREX) 500 MG tablet; TAKE 2 TABLETS BY MOUTH TWICE DAILY FOR 3 DAYS AT ONSET OF SYMPTOMS OF OUTBREAK  Dispense: 30 tablet; Refill: 3    COUNSELING:  Reviewed preventive health counseling, as reflected in patient instructions       Regular exercise       Healthy diet/nutrition       Contraception    Estimated body mass index is 24.23 kg/m  as calculated from the following:    Height as of this encounter: 1.67 m (5' 5.75\").    Weight as of this encounter: 67.6 kg (149 lb).         reports that she has never smoked. She has never used smokeless tobacco.      Counseling Resources:  ATP IV Guidelines  Pooled Cohorts Equation Calculator  Breast Cancer Risk Calculator  FRAX Risk Assessment  ICSI Preventive Guidelines  Dietary Guidelines for Americans, 2010  USDA's MyPlate  ASA Prophylaxis  Lung CA Screening    Kristen M. Kehr, PA-C  [facility]  "

## 2019-07-15 NOTE — NURSING NOTE
"Chief Complaint   Patient presents with     Physical       Initial /74   Pulse 78   Temp 97.8  F (36.6  C) (Tympanic)   Ht 1.67 m (5' 5.75\")   Wt 67.6 kg (149 lb)   LMP  (LMP Unknown)   SpO2 100%   BMI 24.23 kg/m   Estimated body mass index is 24.23 kg/m  as calculated from the following:    Height as of this encounter: 1.67 m (5' 5.75\").    Weight as of this encounter: 67.6 kg (149 lb).  Medication Reconciliation: complete    CLAIRE Vargas MA    "

## 2019-07-16 ASSESSMENT — ASTHMA QUESTIONNAIRES: ACT_TOTALSCORE: 23

## 2019-11-06 ENCOUNTER — OFFICE VISIT (OUTPATIENT)
Dept: PSYCHOLOGY | Facility: CLINIC | Age: 29
End: 2019-11-06
Payer: COMMERCIAL

## 2019-11-06 DIAGNOSIS — Z63.0 PROBLEMS IN RELATIONSHIP WITH SPOUSE OR PARTNER: ICD-10-CM

## 2019-11-06 DIAGNOSIS — F43.20 ADJUSTMENT DISORDER: Primary | ICD-10-CM

## 2019-11-06 PROCEDURE — 90791 PSYCH DIAGNOSTIC EVALUATION: CPT | Performed by: MARRIAGE & FAMILY THERAPIST

## 2019-11-06 SDOH — SOCIAL STABILITY - SOCIAL INSECURITY: PROBLEMS IN RELATIONSHIP WITH SPOUSE OR PARTNER: Z63.0

## 2019-11-06 ASSESSMENT — PATIENT HEALTH QUESTIONNAIRE - PHQ9: SUM OF ALL RESPONSES TO PHQ QUESTIONS 1-9: 7

## 2019-11-06 ASSESSMENT — COLUMBIA-SUICIDE SEVERITY RATING SCALE - C-SSRS
1. IN THE PAST MONTH, HAVE YOU WISHED YOU WERE DEAD OR WISHED YOU COULD GO TO SLEEP AND NOT WAKE UP?: NO
2. HAVE YOU ACTUALLY HAD ANY THOUGHTS OF KILLING YOURSELF LIFETIME?: NO
1. IN THE PAST MONTH, HAVE YOU WISHED YOU WERE DEAD OR WISHED YOU COULD GO TO SLEEP AND NOT WAKE UP?: NO
ATTEMPT PAST THREE MONTHS: NO
2. HAVE YOU ACTUALLY HAD ANY THOUGHTS OF KILLING YOURSELF?: NO
ATTEMPT LIFETIME: NO

## 2019-11-06 ASSESSMENT — ANXIETY QUESTIONNAIRES
3. WORRYING TOO MUCH ABOUT DIFFERENT THINGS: SEVERAL DAYS
6. BECOMING EASILY ANNOYED OR IRRITABLE: NOT AT ALL
1. FEELING NERVOUS, ANXIOUS, OR ON EDGE: NOT AT ALL
4. TROUBLE RELAXING: NOT AT ALL
GAD7 TOTAL SCORE: 3
7. FEELING AFRAID AS IF SOMETHING AWFUL MIGHT HAPPEN: SEVERAL DAYS
5. BEING SO RESTLESS THAT IT IS HARD TO SIT STILL: NOT AT ALL
IF YOU CHECKED OFF ANY PROBLEMS ON THIS QUESTIONNAIRE, HOW DIFFICULT HAVE THESE PROBLEMS MADE IT FOR YOU TO DO YOUR WORK, TAKE CARE OF THINGS AT HOME, OR GET ALONG WITH OTHER PEOPLE: SOMEWHAT DIFFICULT
2. NOT BEING ABLE TO STOP OR CONTROL WORRYING: SEVERAL DAYS

## 2019-11-06 NOTE — PATIENT INSTRUCTIONS
Patient scheduled another appointment for Thurs., Nov. 14 at noon.  Patient reports that during the next week she will try to decide what she wants regarding her relationship with her spouse.  Patient and writer discussed boundaries with friends and , and patient identified some of her past and present concerns.
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
## 2019-11-07 ASSESSMENT — ANXIETY QUESTIONNAIRES: GAD7 TOTAL SCORE: 3

## 2019-11-14 ENCOUNTER — OFFICE VISIT (OUTPATIENT)
Dept: PSYCHOLOGY | Facility: CLINIC | Age: 29
End: 2019-11-14
Payer: COMMERCIAL

## 2019-11-14 DIAGNOSIS — Z63.0 PROBLEMS IN RELATIONSHIP WITH SPOUSE OR PARTNER: ICD-10-CM

## 2019-11-14 DIAGNOSIS — F43.20 ADJUSTMENT DISORDER: Primary | ICD-10-CM

## 2019-11-14 PROCEDURE — 90834 PSYTX W PT 45 MINUTES: CPT | Performed by: MARRIAGE & FAMILY THERAPIST

## 2019-11-14 SDOH — SOCIAL STABILITY - SOCIAL INSECURITY: PROBLEMS IN RELATIONSHIP WITH SPOUSE OR PARTNER: Z63.0

## 2019-11-15 PROBLEM — F43.20 ADJUSTMENT DISORDER: Status: ACTIVE | Noted: 2019-11-15

## 2019-11-15 NOTE — PROGRESS NOTES
Progress Note    Patient Name: Saranya Zaman  Date: 11/14/19         Service Type: Individual  Video Visit: No     Session Start Time: 12 p.m. Session End Time: 1 p.m.     Session Length: 60 minutes    Session #: 2    Attendees: Client attended alone     Treatment Plan Last Reviewed: in development - will be reviewed at next session on 11/21/19  PHQ-9 / JAMAICA-7 : due 11/21/19    DATA  Interactive Complexity: No  Crisis: No       Progress Since Last Session (Related to Symptoms / Goals / Homework):   Symptoms: Improving slightly, per patient's report that she has been able to separate what she wants from what others want from her    Homework: Partially completed - related to identifying social supports and boundaries  Completed in session - identified desired qualities in a relationship      Episode of Care Goals: Satisfactory progress - PREPARATION (Decided to change - considering how); Intervened by negotiating a change plan and determining options / strategies for behavior change, identifying triggers, exploring social supports, and working towards setting a date to begin behavior change     Current / Ongoing Stressors and Concerns:   Relationship issues, recent move and separation, some financial stress, lack of social support due to concerns about reputation/embarrassment     Treatment Objective(s) Addressed in This Session:   participate in at least three activities to improve mood  increase length and frequency of contact with others who are trustworthy  Identify and set at least three boundaries with friends/spouse     Intervention:   CBT: challenge negative thoughts and identify how they are contributing to isolation  Solution Focused: identify social supports and ways to reach out  Psychodynamic/Family systems: look at relationship and family patterns and identify concerns/strengths     ASSESSMENT: Current Emotional / Mental Status (status of significant  "symptoms):   Risk status (Self / Other harm or suicidal ideation)   Patient denies current fears or concerns for personal safety.   Patient denies current or recent suicidal ideation or behaviors.   Patientdenies current or recent homicidal ideation or behaviors.   Patient denies current or recent self injurious behavior or ideation.   Patient denies other safety concerns.   Patient Patient reports there has been no change in risk factors since their last session.     PatientPatient reports there has been no change in protective factors since their last session.     Recommended that patient call 911 or go to the local ED should there be a change in any of these risk factors.     Appearance:   Appropriate    Eye Contact:   Good    Psychomotor Behavior: Normal    Attitude:   Guarded    Orientation:   All   Speech    Rate / Production: Normal     Volume:  Soft    Mood:    Sad    Affect:    Subdued    Thought Content:  Clear    Thought Form:  Blocking (due to being guarded)  Coherent  Logical    Insight:    Good      Medication Review:   No current psychiatric medications prescribed     Medication Compliance:   NA     Changes in Health Issues:   None reported     Chemical Use Review:   Substance Use: Chemical use reviewed, no active concerns identified      Tobacco Use: No current tobacco use.      Diagnosis:  1. Adjustment disorder    2. Problems in relationship with spouse or partner        Collateral Reports Completed:   Not Applicable    PLAN: (Patient Tasks / Therapist Tasks / Other)  Patient scheduled another session for Thurs., Nov. 21 at 12:30 p.m.  Patient states that her goals for the week include \"not isolating\" as much as she has been and attending and riding in a horse show this weekend.  Patient discussed who has been supportive during this time and identified what she would like in a relationship.      Yuko Valdez                                                       "

## 2019-11-15 NOTE — PATIENT INSTRUCTIONS
"Patient scheduled another session for Thurs., Nov. 21 at 12:30 p.m.  Patient states that her goals for the week include \"not isolating\" as much as she has been and attending and riding in a horse show this weekend.  Patient discussed who has been supportive during this time and identified what she would like in a relationship.  "

## 2019-11-19 ENCOUNTER — OFFICE VISIT (OUTPATIENT)
Dept: PSYCHOLOGY | Facility: CLINIC | Age: 29
End: 2019-11-19
Payer: COMMERCIAL

## 2019-11-19 DIAGNOSIS — F43.20 ADJUSTMENT DISORDER: Primary | ICD-10-CM

## 2019-11-19 DIAGNOSIS — Z63.0 PROBLEMS IN RELATIONSHIP WITH SPOUSE OR PARTNER: ICD-10-CM

## 2019-11-19 PROCEDURE — 90834 PSYTX W PT 45 MINUTES: CPT | Performed by: MARRIAGE & FAMILY THERAPIST

## 2019-11-19 SDOH — SOCIAL STABILITY - SOCIAL INSECURITY: PROBLEMS IN RELATIONSHIP WITH SPOUSE OR PARTNER: Z63.0

## 2019-11-19 ASSESSMENT — ANXIETY QUESTIONNAIRES
7. FEELING AFRAID AS IF SOMETHING AWFUL MIGHT HAPPEN: SEVERAL DAYS
1. FEELING NERVOUS, ANXIOUS, OR ON EDGE: SEVERAL DAYS
GAD7 TOTAL SCORE: 8
6. BECOMING EASILY ANNOYED OR IRRITABLE: SEVERAL DAYS
4. TROUBLE RELAXING: MORE THAN HALF THE DAYS
2. NOT BEING ABLE TO STOP OR CONTROL WORRYING: SEVERAL DAYS
IF YOU CHECKED OFF ANY PROBLEMS ON THIS QUESTIONNAIRE, HOW DIFFICULT HAVE THESE PROBLEMS MADE IT FOR YOU TO DO YOUR WORK, TAKE CARE OF THINGS AT HOME, OR GET ALONG WITH OTHER PEOPLE: SOMEWHAT DIFFICULT
3. WORRYING TOO MUCH ABOUT DIFFERENT THINGS: SEVERAL DAYS
5. BEING SO RESTLESS THAT IT IS HARD TO SIT STILL: SEVERAL DAYS

## 2019-11-19 ASSESSMENT — PATIENT HEALTH QUESTIONNAIRE - PHQ9: SUM OF ALL RESPONSES TO PHQ QUESTIONS 1-9: 10

## 2019-11-20 ASSESSMENT — ANXIETY QUESTIONNAIRES: GAD7 TOTAL SCORE: 8

## 2019-11-20 NOTE — PATIENT INSTRUCTIONS
"Patient scheduled another session for Tues., Nov. 26 at 12:30 p.m.  Patient completed the PHQ-9 and JAMAICA-7 in session on this date.  Patient completed her homework and reached out to two friends.  Patient's homework for the next week is to continue to talk with her parents and continue forward with the \"action plan\" they have set in motion.  "

## 2019-11-20 NOTE — PROGRESS NOTES
Progress Note    Patient Name: Saranya Zaman  Date: 11/19/19         Service Type: Individual  Video Visit: No     Session Start Time: 2:30 p.m. Session End Time: 3:30 p.m.     Session Length: 60 minutes    Session #: 3    Attendees: Client attended alone     Treatment Plan Last Reviewed: in development - will be reviewed at next session on 11/26/19  PHQ-9 / JAMAICA-7 : completed in session on this date - due in two weeks     DATA  Interactive Complexity: No  Crisis: No       Progress Since Last Session (Related to Symptoms / Goals / Homework):   Symptoms: Improving slightly, per patient's report that she has been able to separate what she wants from what others want from her; patient admits to experiencing grief during the past weekend    Homework: Completed - related to identifying social supports and boundaries; patient also reached out to two close friends this week        Episode of Care Goals: Satisfactory progress - PREPARATION (Decided to change - considering how); Intervened by negotiating a change plan and determining options / strategies for behavior change, identifying triggers, exploring social supports, and working towards setting a date to begin behavior change     Current / Ongoing Stressors and Concerns:   Relationship issues, recent move and separation, some financial stress, lack of social support due to concerns about reputation/embarrassment     Treatment Objective(s) Addressed in This Session:   participate in at least three activities to improve mood  increase length and frequency of contact with others who are trustworthy  Identify and set at least three boundaries with friends/spouse     Intervention:   CBT: challenge negative thoughts and identify how they are contributing to isolation  Solution Focused: identify social supports and ways to reach out  Psychodynamic/Family systems: look at relationship and family patterns and identify  "concerns/strengths     ASSESSMENT: Current Emotional / Mental Status (status of significant symptoms):   Risk status (Self / Other harm or suicidal ideation)   Patient denies current fears or concerns for personal safety.   Patient denies current or recent suicidal ideation or behaviors.   Patientdenies current or recent homicidal ideation or behaviors.   Patient denies current or recent self injurious behavior or ideation.   Patient denies other safety concerns.  Patient reports there has been no change in risk factors since their last session.     Patient reports there has been no change in protective factors since their last session.     Recommended that patient call 911 or go to the local ED should there be a change in any of these risk factors.     Appearance:   Appropriate    Eye Contact:   Good    Psychomotor Behavior: Normal    Attitude:   Guarded    Orientation:   All   Speech    Rate / Production: Normal     Volume:  Soft    Mood:    Sad    Affect:    Subdued    Thought Content:  Clear    Thought Form:  Blocking (due to being guarded)  Coherent  Logical    Insight:    Good      Medication Review:   No current psychiatric medications prescribed     Medication Compliance:   NA     Changes in Health Issues:   None reported     Chemical Use Review:   Substance Use: Chemical use reviewed, no active concerns identified      Tobacco Use: No current tobacco use.      Diagnosis:  1. Adjustment disorder    2. Problems in relationship with spouse or partner        Collateral Reports Completed:   Not Applicable    PLAN: (Patient Tasks / Therapist Tasks / Other)  Patient scheduled another session for Tues., Nov. 26 at 12:30 p.m.  Patient completed the PHQ-9 and JAMAICA-7 in session on this date.  Patient completed her homework and reached out to two friends.  Patient's homework for the next week is to continue to talk with her parents and continue forward with the \"action plan\" they have set in motion.      Yuko KRISHNAMURTHY" [facility]                                                    Treatment Plan    Client's Name: Saranya Zaman  YOB: 1990    Date: 11/19/19    DSM-V Diagnoses: Adjustment Disorders  309.9 (F43.20) Unspecified  Psychosocial / Contextual Factors: Relationship issues, recent move and separation, some financial stress, lack of social support due to concerns about reputation/embarrassment    WHODAS: 17    Referral / Collaboration:  The following referral(s) was/were discussed but client declines follow up at this time: couples' counseling.    Anticipated number of session or this episode of care: 10-12      MeasurableTreatment Goal(s) related to diagnosis / functional impairment(s)  Goal 1: Client will be able to identify her grief and loss and will increase her connection to supportive others.        Objective #A (Client Action)    Client will talk to at least two others about losses and coping.  Status: New - Date: 11/19/19     Intervention(s)  Therapist will assign homework for patient to talk to her parents about their relationship history and losses..    Objective #B  Client will identify at least three strategies to more effectively address stressors  Status: New - Date: 11/19/19     Intervention(s)  Therapist will validate patient's experience and teach patient about differentiation.      Goal 2: Client will improve her ability to ask for what she wants and to maintain healthy boundaries with friends/romantic partners.    Objective #A (Client Action)    Status: New - Date: 11/19/19     Client will identify 5 traits or charcteristics you like about yourself.    Intervention(s)  Therapist will assign homework ask at least two others in her life what they view as her strengths.    Objective #B  Client will compile a list of boundaries that they would like to set with others. Create plan for when boundaries are challenged..    Status: New - Date: 11/19/19     Intervention(s)  Therapist will role-play  and/or discuss boundary scenarios with patient.      Patient has not reviewed nor agreed to the above plan. Patient will review plan on 11/26/19.      Yuko Valdez  November 19, 2019

## 2019-11-26 ENCOUNTER — OFFICE VISIT (OUTPATIENT)
Dept: PSYCHOLOGY | Facility: CLINIC | Age: 29
End: 2019-11-26
Payer: COMMERCIAL

## 2019-11-26 DIAGNOSIS — F43.20 ADJUSTMENT DISORDER, UNSPECIFIED TYPE: Primary | ICD-10-CM

## 2019-11-26 PROCEDURE — 90834 PSYTX W PT 45 MINUTES: CPT | Performed by: MARRIAGE & FAMILY THERAPIST

## 2019-11-27 NOTE — PATIENT INSTRUCTIONS
Patient scheduled another appointment for Thurs., Dec. 12 at 12:30 p.m.  Patient appeared sigificantly more relaxed and smile frequently throughout the session (much more than usual).  Patient completed her homework: patient reports that she participated in a horse show and talked with a close friend about recent events in patient's life.  Patient has been working on setting boundaries with others and identifying what she wants in a relationship.

## 2019-11-27 NOTE — PROGRESS NOTES
"                                           Progress Note    Patient Name: Saranya Zaman  Date: 11/26/19         Service Type: Individual  Video Visit: No     Session Start Time: 12:30 p.m. Session End Time: 1:30 p.m.     Session Length: 60 minutes    Session #: 4    Attendees: Client attended alone     Treatment Plan Last Reviewed: 11/26/19  PHQ-9 / JAMAICA-7 : due 12/9/19    DATA  Interactive Complexity: No  Crisis: No       Progress Since Last Session (Related to Symptoms / Goals / Homework):   Symptoms: Improving significantly, as patient admits she is feeling \"relieved\" and reports that she has been reaching out to others more    Homework: Completed - related to identifying social supports and boundaries; patient reached out to one other close friend this week        Episode of Care Goals: Satisfactory progress - ACTION (Actively working towards change); Intervened by reinforcing change plan / affirming steps taken     Current / Ongoing Stressors and Concerns:   Relationship issues, recent move and pending divorce, some financial stress, lack of social support due to concerns about reputation/embarrassment     Treatment Objective(s) Addressed in This Session:   participate in at least three activities to improve mood  increase length and frequency of contact with others who are trustworthy  Identify and set at least three boundaries with friends/spouse     Intervention:   CBT: challenge negative thoughts and identify how they are contributing to isolation  Solution Focused: identify social supports and ways to reach out  Psychodynamic/Family systems: look at relationship and family patterns and identify concerns/strengths     ASSESSMENT: Current Emotional / Mental Status (status of significant symptoms):   Risk status (Self / Other harm or suicidal ideation)   Patient denies current fears or concerns for personal safety.   Patient denies current or recent suicidal ideation or behaviors.   Patientdenies " current or recent homicidal ideation or behaviors.   Patient denies current or recent self injurious behavior or ideation.   Patient denies other safety concerns.  Patient reports there has been no change in risk factors since their last session.     Patient reports there has been no change in protective factors since their last session.     Recommended that patient call 911 or go to the local ED should there be a change in any of these risk factors.     Appearance:   Appropriate    Eye Contact:   Good    Psychomotor Behavior: Normal    Attitude:   Guarded    Orientation:   All   Speech    Rate / Production: Normal     Volume:  Soft    Mood:    Elevated    Affect:    Bright    Thought Content:  Clear    Thought Form:  Coherent  Logical    Insight:    Good      Medication Review:   No current psychiatric medications prescribed     Medication Compliance:   NA     Changes in Health Issues:   None reported     Chemical Use Review:   Substance Use: Chemical use reviewed, no active concerns identified      Tobacco Use: No current tobacco use.      Diagnosis:  1. Adjustment disorder, unspecified type        Collateral Reports Completed:   Not Applicable    PLAN: (Patient Tasks / Therapist Tasks / Other)  Patient scheduled another appointment for Thurs., Dec. 12 at 12:30 p.m.  Patient appeared sigificantly more relaxed and smile frequently throughout the session (much more than usual).  Patient completed her homework: patient reports that she participated in a horse show and talked with a close friend about recent events in patient's life.  Patient has been working on setting boundaries with others and identifying what she wants in a relationship.        Yuko Valdez                                                    Treatment Plan    Client's Name: Saranya Zaman  YOB: 1990    Date: 11/19/19    DSM-V Diagnoses: Adjustment Disorders  309.9 (F43.20) Unspecified  Psychosocial / Contextual Factors:  Relationship issues, recent move and separation, some financial stress, lack of social support due to concerns about reputation/embarrassment    WHODAS: 17    Referral / Collaboration:  The following referral(s) was/were discussed but client declines follow up at this time: couples' counseling.    Anticipated number of session or this episode of care: 6-8      MeasurableTreatment Goal(s) related to diagnosis / functional impairment(s)  Goal 1: Client will be able to identify her grief and loss and will increase her connection to supportive others.        Objective #A (Client Action)    Client will talk to at least two others about losses and coping.  Status: New - Date: 11/19/19     Intervention(s)  Therapist will assign homework for patient to talk to her parents about their relationship history and losses..    Objective #B  Client will identify at least three strategies to more effectively address stressors  Status: New - Date: 11/19/19     Intervention(s)  Therapist will validate patient's experience and teach patient about differentiation.      Goal 2: Client will improve her ability to ask for what she wants and to maintain healthy boundaries with friends/romantic partners.    Objective #A (Client Action)    Status: New - Date: 11/19/19     Client will identify 5 traits or charcteristics you like about yourself.    Intervention(s)  Therapist will assign homework ask at least two others in her life what they view as her strengths.    Objective #B  Client will compile a list of boundaries that they would like to set with others. Create plan for when boundaries are challenged..    Status: New - Date: 11/19/19     Intervention(s)  Therapist will role-play and/or discuss boundary scenarios with patient.      Patient agreed to plan.      Yuko Valdez  November 26, 2019

## 2019-12-12 ENCOUNTER — OFFICE VISIT (OUTPATIENT)
Dept: PSYCHOLOGY | Facility: CLINIC | Age: 29
End: 2019-12-12
Payer: COMMERCIAL

## 2019-12-12 DIAGNOSIS — F43.20 ADJUSTMENT DISORDER, UNSPECIFIED TYPE: Primary | ICD-10-CM

## 2019-12-12 PROCEDURE — 90834 PSYTX W PT 45 MINUTES: CPT | Performed by: MARRIAGE & FAMILY THERAPIST

## 2019-12-12 NOTE — PATIENT INSTRUCTIONS
Patient declined to schedule a follow-up appointment at this time, as her symptoms are no longer affecting her functioning.  Patient reports that she is feeling more content and more in control of her life and her decisions. Patient was offered this writer's phone number at work in case she decides to schedule in the future.

## 2019-12-12 NOTE — PROGRESS NOTES
"                                           Progress Note    Patient Name: Saranya Zaman  Date: 12/12/19         Service Type: Individual  Video Visit: No     Session Start Time: 12:30 p.m. Session End Time: 1:30 p.m.     Session Length: 60 minutes    Session #: 5    Attendees: Client attended alone     Treatment Plan Last Reviewed: 11/26/19  PHQ-9 / JAMAICA-7 : due 12/9/19    DATA  Interactive Complexity: No  Crisis: No       Progress Since Last Session (Related to Symptoms / Goals / Homework):   Symptoms: Improving significantly, as patient admits she is feeling \"relieved\" and reports that she has been reaching out to others more; patient states that she has been working on asking for what she wants    Homework: Completed - related to identifying social supports and boundaries; patient reached out to a few other close friends this week        Episode of Care Goals: Satisfactory progress - ACTION (Actively working towards change); Intervened by reinforcing change plan / affirming steps taken     Current / Ongoing Stressors and Concerns:   Relationship issues, recent move and pending divorce, some financial stress, lack of social support due to concerns about reputation/embarrassment     Treatment Objective(s) Addressed in This Session:   participate in at least three activities to improve mood  increase length and frequency of contact with others who are trustworthy  Identify and set at least three boundaries with friends/spouse     Intervention:   CBT: challenge negative thoughts and identify how they are contributing to isolation  Solution Focused: identify social supports and ways to reach out  Psychodynamic/Family systems: look at relationship and family patterns and identify concerns/strengths     ASSESSMENT: Current Emotional / Mental Status (status of significant symptoms):   Risk status (Self / Other harm or suicidal ideation)   Patient denies current fears or concerns for personal safety.   Patient " denies current or recent suicidal ideation or behaviors.   Patientdenies current or recent homicidal ideation or behaviors.   Patient denies current or recent self injurious behavior or ideation.   Patient denies other safety concerns.  Patient reports there has been no change in risk factors since their last session.     Patient reports there has been no change in protective factors since their last session.     Recommended that patient call 911 or go to the local ED should there be a change in any of these risk factors.     Appearance:   Appropriate    Eye Contact:   Good    Psychomotor Behavior: Normal    Attitude:   Guarded    Orientation:   All   Speech    Rate / Production: Normal     Volume:  Soft    Mood:    Elevated    Affect:    Bright    Thought Content:  Clear    Thought Form:  Coherent  Logical    Insight:    Good      Medication Review:   No current psychiatric medications prescribed     Medication Compliance:   NA     Changes in Health Issues:   None reported     Chemical Use Review:   Substance Use: Chemical use reviewed, no active concerns identified      Tobacco Use: No current tobacco use.      Diagnosis:  1. Adjustment disorder, unspecified type        Collateral Reports Completed:   Not Applicable    PLAN: (Patient Tasks / Therapist Tasks / Other)  Patient declined to schedule a follow-up appointment at this time, as her symptoms are no longer affecting her functioning.  Patient reports that she is feeling more content and more in control of her life and her decisions. Patient was offered this writer's phone number at work in case she decides to schedule in the future.          Yuko Valdez                                                    Treatment Plan    Client's Name: Saranya Zaman  YOB: 1990    Date: 11/19/19    DSM-V Diagnoses: Adjustment Disorders  309.9 (F43.20) Unspecified  Psychosocial / Contextual Factors: Relationship issues, recent move and separation,  some financial stress, lack of social support due to concerns about reputation/embarrassment    WHODAS: 17    Referral / Collaboration:  The following referral(s) was/were discussed but client declines follow up at this time: couples' counseling.    Anticipated number of session or this episode of care: 6-8      MeasurableTreatment Goal(s) related to diagnosis / functional impairment(s)  Goal 1: Client will be able to identify her grief and loss and will increase her connection to supportive others.        Objective #A (Client Action)    Client will talk to at least two others about losses and coping.  Status: New - Date: 11/19/19     Intervention(s)  Therapist will assign homework for patient to talk to her parents about their relationship history and losses..    Objective #B  Client will identify at least three strategies to more effectively address stressors  Status: New - Date: 11/19/19     Intervention(s)  Therapist will validate patient's experience and teach patient about differentiation.      Goal 2: Client will improve her ability to ask for what she wants and to maintain healthy boundaries with friends/romantic partners.    Objective #A (Client Action)    Status: New - Date: 11/19/19     Client will identify 5 traits or charcteristics you like about yourself.    Intervention(s)  Therapist will assign homework ask at least two others in her life what they view as her strengths.    Objective #B  Client will compile a list of boundaries that they would like to set with others. Create plan for when boundaries are challenged..    Status: New - Date: 11/19/19     Intervention(s)  Therapist will role-play and/or discuss boundary scenarios with patient.      Patient agreed to plan.      Yuko Valdez  November 26, 2019

## 2020-01-22 ENCOUNTER — FCC EXTENDED DOCUMENTATION (OUTPATIENT)
Dept: PSYCHOLOGY | Facility: CLINIC | Age: 30
End: 2020-01-22

## 2020-01-22 NOTE — PROGRESS NOTES
Discharge Summary  Multiple Sessions    Client Name: Saranya Zaman MRN#: [id number] YOB: 1990      Intake / Discharge Date: 11/6/2019 & 2/24/2020      DSM5 Diagnoses: (Sustained by DSM5 Criteria Listed Above)  Diagnoses: Adjustment Disorders  309.9 (F43.20) Unspecified  Psychosocial & Contextual Factors: Relationship issues, recent move and pending divorce, some financial stress, lack of social support due to concerns about reputation/embarrassment  WHODAS 2.0 Total Score 11/6/2019   Total Score 17     PHQ 11/6/2019 11/19/2019 1/30/2020   PHQ-9 Total Score 7 10 3   Q9: Thoughts of better off dead/self-harm past 2 weeks Not at all Not at all Not at all     JAMAICA-7 SCORE 11/6/2019 11/19/2019   Total Score 3 8            Presenting Concern:  Relationship problems and anxiety      Reason for Discharge:  Client is satisfied with progress and Goals completed      Disposition at Time of Last Encounter:   Comments:   Patient and writer mutually agreed that patient was no longer symptomatic; patient reports benefiting from short-term therapy.     Risk Management:   Client denies a history of suicidal ideation, suicide attempts, self-injurious behavior, homicidal ideation, homicidal behavior and and other safety concerns  Recommended that patient call 911 or go to the local ED should there be a change in any of these risk factors.      Referred To:  PCP; patient is welcome to return to [facility] in the future.        Yuko Valdez   1/22/2020

## 2020-01-30 ENCOUNTER — OFFICE VISIT (OUTPATIENT)
Dept: FAMILY MEDICINE | Facility: CLINIC | Age: 30
End: 2020-01-30
Payer: COMMERCIAL

## 2020-01-30 VITALS
DIASTOLIC BLOOD PRESSURE: 76 MMHG | BODY MASS INDEX: 23.99 KG/M2 | TEMPERATURE: 98.6 F | OXYGEN SATURATION: 99 % | HEIGHT: 65 IN | SYSTOLIC BLOOD PRESSURE: 127 MMHG | HEART RATE: 82 BPM | WEIGHT: 144 LBS

## 2020-01-30 DIAGNOSIS — N87.1 MODERATE DYSPLASIA OF CERVIX (CIN II): Primary | ICD-10-CM

## 2020-01-30 DIAGNOSIS — N91.2 AMENORRHEA: ICD-10-CM

## 2020-01-30 DIAGNOSIS — Z11.3 SCREEN FOR STD (SEXUALLY TRANSMITTED DISEASE): ICD-10-CM

## 2020-01-30 DIAGNOSIS — J45.20 INTERMITTENT ASTHMA, UNCOMPLICATED: ICD-10-CM

## 2020-01-30 DIAGNOSIS — Z12.4 SCREENING FOR MALIGNANT NEOPLASM OF CERVIX: ICD-10-CM

## 2020-01-30 DIAGNOSIS — B00.1 COLD SORE: ICD-10-CM

## 2020-01-30 DIAGNOSIS — Z30.41 ENCOUNTER FOR SURVEILLANCE OF CONTRACEPTIVE PILLS: ICD-10-CM

## 2020-01-30 LAB — HCG UR QL: NEGATIVE

## 2020-01-30 PROCEDURE — 99213 OFFICE O/P EST LOW 20 MIN: CPT | Performed by: PHYSICIAN ASSISTANT

## 2020-01-30 PROCEDURE — G0476 HPV COMBO ASSAY CA SCREEN: HCPCS | Performed by: PHYSICIAN ASSISTANT

## 2020-01-30 PROCEDURE — 88175 CYTOPATH C/V AUTO FLUID REDO: CPT | Performed by: PHYSICIAN ASSISTANT

## 2020-01-30 PROCEDURE — 87491 CHLMYD TRACH DNA AMP PROBE: CPT | Performed by: PHYSICIAN ASSISTANT

## 2020-01-30 PROCEDURE — 81025 URINE PREGNANCY TEST: CPT | Performed by: PHYSICIAN ASSISTANT

## 2020-01-30 PROCEDURE — 87591 N.GONORRHOEAE DNA AMP PROB: CPT | Performed by: PHYSICIAN ASSISTANT

## 2020-01-30 RX ORDER — ALBUTEROL SULFATE 90 UG/1
2 AEROSOL, METERED RESPIRATORY (INHALATION) EVERY 6 HOURS
Qty: 1 INHALER | Refills: 3 | Status: SHIPPED | OUTPATIENT
Start: 2020-01-30

## 2020-01-30 RX ORDER — NORETHINDRONE ACETATE AND ETHINYL ESTRADIOL 1MG-20(21)
1 KIT ORAL DAILY
Qty: 84 TABLET | Refills: 4 | Status: SHIPPED | OUTPATIENT
Start: 2020-01-30

## 2020-01-30 RX ORDER — VALACYCLOVIR HYDROCHLORIDE 500 MG/1
TABLET, FILM COATED ORAL
Qty: 30 TABLET | Refills: 3 | Status: SHIPPED | OUTPATIENT
Start: 2020-01-30 | End: 2020-12-21

## 2020-01-30 ASSESSMENT — ENCOUNTER SYMPTOMS
HEMATOCHEZIA: 0
HEMATURIA: 0
HEADACHES: 0
JOINT SWELLING: 0
CHILLS: 0
COUGH: 0
DIARRHEA: 0
NAUSEA: 0
CONSTIPATION: 0
BREAST MASS: 0
WEAKNESS: 0
HEARTBURN: 0
DIZZINESS: 0
FEVER: 0
DYSURIA: 0
SHORTNESS OF BREATH: 0
ARTHRALGIAS: 0
PALPITATIONS: 0
FREQUENCY: 0
EYE PAIN: 0
ABDOMINAL PAIN: 0
NERVOUS/ANXIOUS: 0
SORE THROAT: 0
MYALGIAS: 0
PARESTHESIAS: 0

## 2020-01-30 ASSESSMENT — PATIENT HEALTH QUESTIONNAIRE - PHQ9: SUM OF ALL RESPONSES TO PHQ QUESTIONS 1-9: 3

## 2020-01-30 ASSESSMENT — PAIN SCALES - GENERAL: PAINLEVEL: NO PAIN (0)

## 2020-01-30 ASSESSMENT — MIFFLIN-ST. JEOR: SCORE: 1379.06

## 2020-01-30 NOTE — PROGRESS NOTES
Subjective     Saranya Zaman is a 30 year old female who presents to clinic today for the following health issues:    HPI     Pap and refill meds.   She has a history of cervical dysplasia and had a LEEP procedure. She is due for Pap testing today. She had a routine exam 6 months ago.   6/1/15 pap ASCUS @ 25 y.o . Plan: repeat pap smear in 1 year. Due 6/1/16.   7/8/16 NIL pap. Plan: cytology in 1 year.   7/10/17 ASCUS Pap, + HR HPV (Not 16/18). Plan colp  8/14/17 [place] - visually normal; no biopsies taken. Plan cotest in 1 year.   7/12/18 LSIL Pap, + HR HPV (Neg 16/18). Plan colp due by 10/12/18.   9/6/18 [place] Bx - ASUNCION 3. Plan LEEP  9/24/18 L[place] Bx - ASUNCION 1 & 2. Margins negative. Plan pap in 3 months.   1/10/19 NIL pap. Plan cotest in 1 year    PROBLEMS TO ADD ON...  1. Asthma: refills needed for the albuterol, she uses this only as needed, asthma is well controlled.   2. Contraception: she has been on this medication for years, she often does not have a menstrual cycle. She has some concerns. ? Missed cycles / spotting/ compliance. Requesting pregnancy testing and STD screening, she has a new partner.     Patient Active Problem List   Diagnosis     Chronic rhinitis     Fibroadenoma of breast     CARDIOVASCULAR SCREENING; LDL GOAL LESS THAN 160     Contraception     Moderate dysplasia of cervix (ASUNCION II)     Intermittent asthma, uncomplicated     Adjustment disorder     Past Surgical History:   Procedure Laterality Date     BIOPSY OF BREAST, INCISIONAL  04/17/2008    left fibroadenoma     LUMPECTOMY BREAST  3/11    right      SURGICAL HISTORY OF -   2010    wisdom teeth       Social History     Tobacco Use     Smoking status: Never Smoker     Smokeless tobacco: Never Used   Substance Use Topics     Alcohol use: No     Family History   Problem Relation Age of Onset     Hypertension Mother      Arthritis Mother      Depression Mother      Anxiety Disorder Mother      Substance Abuse Mother      Prostate Cancer  "Father      Hyperlipidemia Father      Lymphoma Father 73     Osteoporosis Maternal Grandmother      Cancer Maternal Grandfather      Cerebrovascular Disease Maternal Grandfather         70's, smoker     Cardiovascular Paternal Grandmother      Diabetes No family hx of      Breast Cancer No family hx of      Colon Cancer No family hx of      Thyroid Disease No family hx of      Genetic Disorder No family hx of          Current Outpatient Medications   Medication Sig Dispense Refill     albuterol (PROAIR HFA/PROVENTIL HFA/VENTOLIN HFA) 108 (90 Base) MCG/ACT inhaler Inhale 2 puffs into the lungs every 6 hours 1 Inhaler 3     fexofenadine (ALLEGRA) 60 MG tablet Take 1 tablet by mouth 2 times daily. Will need office visit for further refills 60 tablet 0     norethindrone-ethinyl estradiol (JUNEL FE 1/20) 1-20 MG-MCG tablet Take 1 tablet by mouth daily 84 tablet 4     valACYclovir (VALTREX) 500 MG tablet TAKE 2 TABLETS BY MOUTH TWICE DAILY FOR 3 DAYS AT ONSET OF SYMPTOMS OF OUTBREAK 30 tablet 3     Allergies   Allergen Reactions     Cats      Dogs      Dust Mites      Grass      Horse Allergy      Mold      Pollen [Pollen Extract]      BP Readings from Last 3 Encounters:   01/30/20 127/76   07/15/19 112/74   01/10/19 130/76    Wt Readings from Last 3 Encounters:   01/30/20 65.3 kg (144 lb)   07/15/19 67.6 kg (149 lb)   01/10/19 67.1 kg (148 lb)                    Reviewed and updated as needed this visit by Provider         Review of Systems   ROS COMP: Constitutional, HEENT, cardiovascular, pulmonary, GI, , musculoskeletal, neuro, skin, endocrine and psych systems are negative, except as otherwise noted.      Objective    /76   Pulse 82   Temp 98.6  F (37  C) (Oral)   Ht 1.651 m (5' 5\")   Wt 65.3 kg (144 lb)   SpO2 99%   BMI 23.96 kg/m    Body mass index is 23.96 kg/m .  Physical Exam   GENERAL: healthy, alert and no distress  EYES: Eyes grossly normal to inspection, PERRL and conjunctivae and sclerae " normal  HENT: ear canals and TM's normal, nose and mouth without ulcers or lesions  NECK: no adenopathy, no asymmetry, masses, or scars and thyroid normal to palpation  RESP: lungs clear to auscultation - no rales, rhonchi or wheezes  CV: regular rate and rhythm, normal S1 S2, no S3 or S4, no murmur, click or rub, no peripheral edema and peripheral pulses strong   (female): normal female external genitalia, normal urethral meatus, vaginal mucosa, normal cervix/adnexa/uterus without masses or discharge  MS: no gross musculoskeletal defects noted, no edema  SKIN: no suspicious lesions or rashes  PSYCH: mentation appears normal, affect normal/bright    Diagnostic Test Results:  Labs reviewed in Epic        Assessment & Plan     1. Moderate dysplasia of cervix (ASUNCION II)  2. Screening for malignant neoplasm of cervix  Pap completed today.   - Pap imaged thin layer diagnostic with HPV (select HPV order below)  - HPV High Risk Types DNA Cervical    3. Intermittent asthma, uncomplicated  Stable, refills given  - albuterol (PROAIR HFA/PROVENTIL HFA/VENTOLIN HFA) 108 (90 Base) MCG/ACT inhaler; Inhale 2 puffs into the lungs every 6 hours  Dispense: 1 Inhaler; Refill: 3    4. Encounter for surveillance of contraceptive pills  Stable, refills given  - norethindrone-ethinyl estradiol (JUNEL FE 1/20) 1-20 MG-MCG tablet; Take 1 tablet by mouth daily  Dispense: 84 tablet; Refill: 4    5. Screen for STD (sexually transmitted disease)  - Chlamydia trachomatis PCR  - Neisseria gonorrhoeae PCR    6. Amenorrhea  - HCG Qual, Urine ([id number])    7. Cold sore  - valACYclovir (VALTREX) 500 MG tablet; TAKE 2 TABLETS BY MOUTH TWICE DAILY FOR 3 DAYS AT ONSET OF SYMPTOMS OF OUTBREAK  Dispense: 30 tablet; Refill: 3         Return in about 1 year (around 1/30/2021) for Routine Visit.    Kristen M. Kehr, PA-C  [facility]

## 2020-01-30 NOTE — NURSING NOTE
"Chief Complaint   Patient presents with     Gyn Exam     Refill Request       Initial /76   Pulse 82   Temp 98.6  F (37  C) (Oral)   Ht 1.651 m (5' 5\")   Wt 65.3 kg (144 lb)   SpO2 99%   BMI 23.96 kg/m   Estimated body mass index is 23.96 kg/m  as calculated from the following:    Height as of this encounter: 1.651 m (5' 5\").    Weight as of this encounter: 65.3 kg (144 lb).  Medication Reconciliation: complete    CLAIRE Vargas MA    "

## 2020-01-30 NOTE — LETTER
My Asthma Action Plan    Name: Saranya Zaman   YOB: 1990  Date: 1/30/2020   My doctor: Kristen M. Kehr, PA-C   My clinic: [facility]        My Rescue Medicine:   Albuterol inhaler (Proair/Ventolin/Proventil HFA)  2-4 puffs EVERY 4 HOURS as needed. Use a spacer if recommended by your provider.   My Asthma Severity:   Intermittent / Exercise Induced  Know your asthma triggers: pollens             GREEN ZONE   Good Control    I feel good    No cough or wheeze    Can work, sleep and play without asthma symptoms       Take your asthma control medicine every day.     1. If exercise triggers your asthma, take your rescue medication    15 minutes before exercise or sports, and    During exercise if you have asthma symptoms  2. Spacer to use with inhaler: If you have a spacer, make sure to use it with your inhaler             YELLOW ZONE Getting Worse  I have ANY of these:    I do not feel good    Cough or wheeze    Chest feels tight    Wake up at night   1. Keep taking your Green Zone medications  2. Start taking your rescue medicine:    every 20 minutes for up to 1 hour. Then every 4 hours for 24-48 hours.  3. If you stay in the Yellow Zone for more than 12-24 hours, contact your doctor.  4. If you do not return to the Green Zone in 12-24 hours or you get worse, start taking your oral steroid medicine if prescribed by your provider.           RED ZONE Medical Alert - Get Help  I have ANY of these:    I feel awful    Medicine is not helping    Breathing getting harder    Trouble walking or talking    Nose opens wide to breathe       1. Take your rescue medicine NOW  2. If your provider has prescribed an oral steroid medicine, start taking it NOW  3. Call your doctor NOW  4. If you are still in the Red Zone after 20 minutes and you have not reached your doctor:    Take your rescue medicine again and    Call 911 or go to the emergency room right away    See your regular doctor within 2  weeks of an Emergency Room or Urgent Care visit for follow-up treatment.          Annual Reminders:  Meet with Asthma Educator,  Flu Shot in the Fall, consider Pneumonia Vaccination for patients with asthma (aged 19 and older).    Pharmacy:    [id number] - [place], [place] - [address] AT [facility] [address]  [id number] - [place], [place] - [address]  [id number] - [place], [place] - [address]  [id number] - [place], [place] - [address] [facility] [address]  [id number] - [place], [place] - [address] AT [facility] OF [address]    Electronically signed by Kristen M. Kehr, PA-C   Date: 01/30/20                    Asthma Triggers  How To Control Things That Make Your Asthma Worse    Triggers are things that make your asthma worse.  Look at the list below to help you find your triggers and   what you can do about them. You can help prevent asthma flare-ups by staying away from your triggers.      Trigger                                                          What you can do   Cigarette Smoke  Tobacco smoke can make asthma worse. Do not allow smoking in your home, car or around you.  Be sure no one smokes at a child s day care or school.  If you smoke, ask your health care provider for ways to help you quit.  Ask family members to quit too.  Ask your health care provider for a referral to Quit Plan to help you quit smoking, or call 8-922-684-PLAN.     Colds, Flu, Bronchitis  These are common triggers of asthma. Wash your hands often.  Don t touch your eyes, nose or mouth.  Get a flu shot every year.     Dust Mites  These are tiny bugs that live in cloth or carpet. They are too small to see. Wash sheets and blankets in hot water every week.   Encase pillows and mattress in dust mite proof covers.  Avoid having carpet if you can. If you have  carpet, vacuum weekly.   Use a dust mask and HEPA vacuum.   Pollen and Outdoor Mold  Some people are allergic to trees, grass, or weed pollen, or molds. Try to keep your windows closed.  Limit time out doors when pollen count is high.   Ask you health care provider about taking medicine during allergy season.     Animal Dander  Some people are allergic to skin flakes, urine or saliva from pets with fur or feathers. Keep pets with fur or feathers out of your home.    If you can t keep the pet outdoors, then keep the pet out of your bedroom.  Keep the bedroom door closed.  Keep pets off cloth furniture and away from stuffed toys.     Mice, Rats, and Cockroaches  Some people are allergic to the waste from these pests.   Cover food and garbage.  Clean up spills and food crumbs.  Store grease in the refrigerator.   Keep food out of the bedroom.   Indoor Mold  This can be a trigger if your home has high moisture. Fix leaking faucets, pipes, or other sources of water.   Clean moldy surfaces.  Dehumidify basement if it is damp and smelly.   Smoke, Strong Odors, and Sprays  These can reduce air quality. Stay away from strong odors and sprays, such as perfume, powder, hair spray, paints, smoke incense, paint, cleaning products, candles and new carpet.   Exercise or Sports  Some people with asthma have this trigger. Be active!  Ask your doctor about taking medicine before sports or exercise to prevent symptoms.    Warm up for 5-10 minutes before and after sports or exercise.     Other Triggers of Asthma  Cold air:  Cover your nose and mouth with a scarf.  Sometimes laughing or crying can be a trigger.  Some medicines and food can trigger asthma.

## 2020-01-30 NOTE — PROGRESS NOTES
SUBJECTIVE:   CC: Saranya Zaman is an 30 year old woman who presents for preventive health visit.     Healthy Habits:     Getting at least 3 servings of Calcium per day:  Yes    Bi-annual eye exam:  NO    Dental care twice a year:  NO    Sleep apnea or symptoms of sleep apnea:  None    Diet:  Regular (no restrictions)    Frequency of exercise:  1 day/week    Duration of exercise:  15-30 minutes    Taking medications regularly:  Yes    Medication side effects:  None    PHQ-2 Total Score: 0    Additional concerns today:  No        Today's PHQ-2 Score:   PHQ-2 ( 1999 Pfizer) 1/30/2020   Q1: Little interest or pleasure in doing things 0   Q2: Feeling down, depressed or hopeless 0   PHQ-2 Score 0   Q1: Little interest or pleasure in doing things Not at all   Q2: Feeling down, depressed or hopeless Not at all   PHQ-2 Score 0       Abuse: Current or Past(Physical, Sexual or Emotional)- No  Do you feel safe in your environment? Yes        Social History     Tobacco Use     Smoking status: Never Smoker     Smokeless tobacco: Never Used   Substance Use Topics     Alcohol use: No     If you drink alcohol do you typically have >3 drinks per day or >7 drinks per week? No    Alcohol Use 1/30/2020   Prescreen: >3 drinks/day or >7 drinks/week? No   Prescreen: >3 drinks/day or >7 drinks/week? -   No flowsheet data found.    Reviewed orders with patient.  Reviewed health maintenance and updated orders accordingly - Yes  {Chronicprobdata (optional):284592}    {Mammo Decision Support (Optional):433278}    Pertinent mammograms are reviewed under the imaging tab.  History of abnormal Pap smear: { :994326}  PAP / HPV Latest Ref Rng & Units 1/10/2019 7/12/2018 7/10/2017   PAP - NIL LSIL(A) ASC-US(A)   HPV 16 DNA NEG:Negative - Negative Negative   HPV 18 DNA NEG:Negative - Negative Negative   OTHER HR HPV NEG:Negative - Positive(A) Positive(A)     Reviewed and updated as needed this visit by clinical staff  Tobacco  Allergies   "Meds  Med Hx  Surg Hx  Fam Hx  Soc Hx        Reviewed and updated as needed this visit by Provider        {HISTORY OPTIONS (Optional):383632}    Review of Systems   Constitutional: Negative for chills and fever.   HENT: Negative for congestion, ear pain, hearing loss and sore throat.    Eyes: Negative for pain and visual disturbance.   Respiratory: Negative for cough and shortness of breath.    Cardiovascular: Negative for chest pain, palpitations and peripheral edema.   Gastrointestinal: Negative for abdominal pain, constipation, diarrhea, heartburn, hematochezia and nausea.   Breasts:  Negative for tenderness, breast mass and discharge.   Genitourinary: Negative for dysuria, frequency, genital sores, hematuria, pelvic pain, urgency, vaginal bleeding and vaginal discharge.   Musculoskeletal: Negative for arthralgias, joint swelling and myalgias.   Skin: Negative for rash.   Neurological: Negative for dizziness, weakness, headaches and paresthesias.   Psychiatric/Behavioral: Negative for mood changes. The patient is not nervous/anxious.      {FEMALE ROS (Optional):862196}     OBJECTIVE:   /76   Pulse 82   Temp 98.6  F (37  C) (Oral)   Ht 1.651 m (5' 5\")   Wt 65.3 kg (144 lb)   SpO2 99%   BMI 23.96 kg/m    Physical Exam  {Exam Choices (Optional):575515}    {Diagnostic Test Results (Optional):616943::\"Diagnostic Test Results:\",\"Labs reviewed in Epic\"}    ASSESSMENT/PLAN:   {Diag Picklist:769574}    COUNSELING:  {FEMALE COUNSELING MESSAGES:117379::\"Reviewed preventive health counseling, as reflected in patient instructions\"}    Estimated body mass index is 23.96 kg/m  as calculated from the following:    Height as of this encounter: 1.651 m (5' 5\").    Weight as of this encounter: 65.3 kg (144 lb).    {Weight Management Plan (ACO) Complete if BMI is abnormal-  Ages 18-64  BMI >24.9.  Age 65+ with BMI <23 or >30 (Optional):558628}     reports that she has never smoked. She has never used smokeless " tobacco.  {Tobacco Cessation -- Complete if patient is a smoker (Optional):050954}    Counseling Resources:  ATP IV Guidelines  Pooled Cohorts Equation Calculator  Breast Cancer Risk Calculator  FRAX Risk Assessment  ICSI Preventive Guidelines  Dietary Guidelines for Americans, 2010  USDA's MyPlate  ASA Prophylaxis  Lung CA Screening    Kristen M. Kehr, PA-C  [facility]

## 2020-01-31 LAB
C TRACH DNA SPEC QL NAA+PROBE: NEGATIVE
N GONORRHOEA DNA SPEC QL NAA+PROBE: NEGATIVE
SPECIMEN SOURCE: NORMAL
SPECIMEN SOURCE: NORMAL

## 2020-01-31 ASSESSMENT — ASTHMA QUESTIONNAIRES: ACT_TOTALSCORE: 23

## 2020-02-03 LAB
COPATH REPORT: NORMAL
PAP: NORMAL

## 2020-02-04 LAB
FINAL DIAGNOSIS: NORMAL
HPV HR 12 DNA CVX QL NAA+PROBE: NEGATIVE
HPV16 DNA SPEC QL NAA+PROBE: NEGATIVE
HPV18 DNA SPEC QL NAA+PROBE: NEGATIVE
SPECIMEN DESCRIPTION: NORMAL
SPECIMEN SOURCE CVX/VAG CYTO: NORMAL

## 2020-02-24 ENCOUNTER — HEALTH MAINTENANCE LETTER (OUTPATIENT)
Age: 30
End: 2020-02-24

## 2020-12-13 ENCOUNTER — HEALTH MAINTENANCE LETTER (OUTPATIENT)
Age: 30
End: 2020-12-13

## 2020-12-17 DIAGNOSIS — B00.1 COLD SORE: ICD-10-CM

## 2020-12-21 RX ORDER — VALACYCLOVIR HYDROCHLORIDE 500 MG/1
TABLET, FILM COATED ORAL
Qty: 30 TABLET | Refills: 3 | Status: SHIPPED | OUTPATIENT
Start: 2020-12-21

## 2020-12-21 NOTE — TELEPHONE ENCOUNTER
Valacyclovir refill request  Uses for cold sores  Last OV 1/30/20 with K. Kehr.    RN UNABLE TO REFILL: FAILED REFILL PROTOCOL.  To provider to advise    Antivirals for Herpes Protocol Failed      Normal serum creatinine on file in past 12 months    No lab results found.

## 2021-01-20 ENCOUNTER — MYC MEDICAL ADVICE (OUTPATIENT)
Dept: OBGYN | Facility: CLINIC | Age: 31
End: 2021-01-20

## 2021-01-20 ENCOUNTER — PATIENT OUTREACH (OUTPATIENT)
Dept: FAMILY MEDICINE | Facility: CLINIC | Age: 31
End: 2021-01-20

## 2021-01-20 DIAGNOSIS — N87.1 MODERATE DYSPLASIA OF CERVIX (CIN II): ICD-10-CM

## 2021-09-26 ENCOUNTER — HEALTH MAINTENANCE LETTER (OUTPATIENT)
Age: 31
End: 2021-09-26

## 2022-01-16 ENCOUNTER — HEALTH MAINTENANCE LETTER (OUTPATIENT)
Age: 32
End: 2022-01-16

## 2022-09-09 NOTE — PROGRESS NOTES
.   [Joint Pain] : joint pain [Negative] : Respiratory [Blurry Vision] : no blurred vision [Dyspnea on exertion] : not dyspnea during exertion [Chest Discomfort] : no chest discomfort [Lower Ext Edema] : no extremity edema [Palpitations] : no palpitations [Orthopnea] : no orthopnea [PND] : no PND [Abdominal Pain] : no abdominal pain [Nausea] : no nausea [Vomiting] : no vomiting [Heartburn] : no heartburn [Rash] : no rash [Itching] : no itching [Dizziness] : no dizziness [Tremor] : no tremor was seen [Numbness (Hypoesthesia)] : no numbness [Tingling (Paresthesia)] : no tingling [Confusion] : no confusion was observed [Anxiety] : no anxiety [Under Stress] : not under stress [Easy Bleeding] : no tendency for easy bleeding [Easy Bruising] : no tendency for easy bruising

## 2023-04-23 ENCOUNTER — HEALTH MAINTENANCE LETTER (OUTPATIENT)
Age: 33
End: 2023-04-23